# Patient Record
Sex: FEMALE | Race: BLACK OR AFRICAN AMERICAN | NOT HISPANIC OR LATINO | Employment: UNEMPLOYED | URBAN - METROPOLITAN AREA
[De-identification: names, ages, dates, MRNs, and addresses within clinical notes are randomized per-mention and may not be internally consistent; named-entity substitution may affect disease eponyms.]

---

## 2018-08-23 ENCOUNTER — APPOINTMENT (OUTPATIENT)
Dept: LAB | Facility: HOSPITAL | Age: 23
End: 2018-08-23

## 2018-08-23 ENCOUNTER — LAB REQUISITION (OUTPATIENT)
Dept: LAB | Facility: HOSPITAL | Age: 23
End: 2018-08-23

## 2018-08-23 ENCOUNTER — TRANSCRIBE ORDERS (OUTPATIENT)
Dept: LAB | Facility: HOSPITAL | Age: 23
End: 2018-08-23

## 2018-08-23 DIAGNOSIS — Z3A.34 34 WEEKS GESTATION OF PREGNANCY: Primary | ICD-10-CM

## 2018-08-23 DIAGNOSIS — Z34.83 PRENATAL CARE, SUBSEQUENT PREGNANCY, THIRD TRIMESTER: ICD-10-CM

## 2018-08-23 DIAGNOSIS — Z3A.34 34 WEEKS GESTATION OF PREGNANCY: ICD-10-CM

## 2018-08-23 DIAGNOSIS — Z34.83 ENCOUNTER FOR SUPERVISION OF OTHER NORMAL PREGNANCY, THIRD TRIMESTER: ICD-10-CM

## 2018-08-23 DIAGNOSIS — Z00.00 ROUTINE GENERAL MEDICAL EXAMINATION AT A HEALTH CARE FACILITY: ICD-10-CM

## 2018-08-23 LAB
EXTERNAL CHLAMYDIA SCREEN: NEGATIVE
EXTERNAL GONORRHEA SCREEN: NEGATIVE
EXTERNAL GROUP B STREP ANTIGEN: NEGATIVE
EXTERNAL HEPATITIS C AB: NONREACTIVE
EXTERNAL RUBELLA QUALITATIVE: NORMAL
EXTERNAL SYPHILIS RPR SCREEN: NORMAL
HIV1 P24 AG SERPL QL IA: NORMAL

## 2018-08-23 PROCEDURE — 36415 COLL VENOUS BLD VENIPUNCTURE: CPT | Performed by: NURSE PRACTITIONER

## 2018-09-13 ENCOUNTER — HOSPITAL ENCOUNTER (INPATIENT)
Facility: HOSPITAL | Age: 23
LOS: 2 days | Discharge: HOME | End: 2018-09-15
Attending: NURSE PRACTITIONER | Admitting: NURSE PRACTITIONER

## 2018-09-13 ENCOUNTER — ANESTHESIA (OUTPATIENT)
Dept: LABOR AND DELIVERY | Facility: HOSPITAL | Age: 23
End: 2018-09-13

## 2018-09-13 ENCOUNTER — ANESTHESIA EVENT (OUTPATIENT)
Dept: LABOR AND DELIVERY | Facility: HOSPITAL | Age: 23
End: 2018-09-13

## 2018-09-13 LAB
ABO GROUP BLD: NORMAL
BLD GP AB SCN SERPL QL: NEGATIVE
DEPRECATED RDW RBC AUTO: 33.6 FL (ref 37–54)
ERYTHROCYTE [DISTWIDTH] IN BLOOD BY AUTOMATED COUNT: 13.7 % (ref 11.3–14.5)
HCT VFR BLD AUTO: 31.5 % (ref 34.5–44)
HGB BLD-MCNC: 10 G/DL (ref 11.5–15.5)
MCH RBC QN AUTO: 21.4 PG (ref 27–31)
MCHC RBC AUTO-ENTMCNC: 31.7 G/DL (ref 32–36)
MCV RBC AUTO: 67.3 FL (ref 80–99)
PLATELET # BLD AUTO: 122 10*3/MM3 (ref 150–450)
RBC # BLD AUTO: 4.68 10*6/MM3 (ref 3.89–5.14)
RH BLD: POSITIVE
T&S EXPIRATION DATE: NORMAL
WBC NRBC COR # BLD: 4.85 10*3/MM3 (ref 3.5–10.8)

## 2018-09-13 PROCEDURE — C1755 CATHETER, INTRASPINAL: HCPCS | Performed by: ANESTHESIOLOGY

## 2018-09-13 PROCEDURE — 86850 RBC ANTIBODY SCREEN: CPT | Performed by: NURSE PRACTITIONER

## 2018-09-13 PROCEDURE — 25010000002 ROPIVACAINE PER 1 MG: Performed by: ANESTHESIOLOGY

## 2018-09-13 PROCEDURE — 25010000002 FENTANYL CITRATE (PF) 100 MCG/2ML SOLUTION: Performed by: ANESTHESIOLOGY

## 2018-09-13 PROCEDURE — 85027 COMPLETE CBC AUTOMATED: CPT | Performed by: NURSE PRACTITIONER

## 2018-09-13 PROCEDURE — 0HQ9XZZ REPAIR PERINEUM SKIN, EXTERNAL APPROACH: ICD-10-PCS | Performed by: NURSE PRACTITIONER

## 2018-09-13 PROCEDURE — 59025 FETAL NON-STRESS TEST: CPT

## 2018-09-13 PROCEDURE — 86901 BLOOD TYPING SEROLOGIC RH(D): CPT | Performed by: NURSE PRACTITIONER

## 2018-09-13 PROCEDURE — 86900 BLOOD TYPING SEROLOGIC ABO: CPT | Performed by: NURSE PRACTITIONER

## 2018-09-13 RX ORDER — LIDOCAINE HYDROCHLORIDE AND EPINEPHRINE 15; 5 MG/ML; UG/ML
INJECTION, SOLUTION EPIDURAL AS NEEDED
Status: DISCONTINUED | OUTPATIENT
Start: 2018-09-13 | End: 2018-09-13 | Stop reason: SURG

## 2018-09-13 RX ORDER — FENTANYL CITRATE 50 UG/ML
INJECTION, SOLUTION INTRAMUSCULAR; INTRAVENOUS AS NEEDED
Status: DISCONTINUED | OUTPATIENT
Start: 2018-09-13 | End: 2018-09-13 | Stop reason: SURG

## 2018-09-13 RX ORDER — CARBOPROST TROMETHAMINE 250 UG/ML
250 INJECTION, SOLUTION INTRAMUSCULAR AS NEEDED
Status: DISCONTINUED | OUTPATIENT
Start: 2018-09-13 | End: 2018-09-13 | Stop reason: HOSPADM

## 2018-09-13 RX ORDER — IBUPROFEN 600 MG/1
600 TABLET ORAL EVERY 6 HOURS PRN
Status: DISCONTINUED | OUTPATIENT
Start: 2018-09-13 | End: 2018-09-13 | Stop reason: HOSPADM

## 2018-09-13 RX ORDER — OXYTOCIN-SODIUM CHLORIDE 0.9% IV SOLN 30 UNIT/500ML 30-0.9/5 UT/ML-%
650 SOLUTION INTRAVENOUS ONCE
Status: COMPLETED | OUTPATIENT
Start: 2018-09-13 | End: 2018-09-13

## 2018-09-13 RX ORDER — TRISODIUM CITRATE DIHYDRATE AND CITRIC ACID MONOHYDRATE 500; 334 MG/5ML; MG/5ML
30 SOLUTION ORAL ONCE
Status: DISCONTINUED | OUTPATIENT
Start: 2018-09-13 | End: 2018-09-13 | Stop reason: HOSPADM

## 2018-09-13 RX ORDER — OXYCODONE AND ACETAMINOPHEN 10; 325 MG/1; MG/1
2 TABLET ORAL EVERY 4 HOURS PRN
Status: DISCONTINUED | OUTPATIENT
Start: 2018-09-13 | End: 2018-09-13 | Stop reason: HOSPADM

## 2018-09-13 RX ORDER — MISOPROSTOL 200 UG/1
800 TABLET ORAL AS NEEDED
Status: DISCONTINUED | OUTPATIENT
Start: 2018-09-13 | End: 2018-09-13 | Stop reason: HOSPADM

## 2018-09-13 RX ORDER — DIPHENHYDRAMINE HYDROCHLORIDE 50 MG/ML
12.5 INJECTION INTRAMUSCULAR; INTRAVENOUS EVERY 8 HOURS PRN
Status: DISCONTINUED | OUTPATIENT
Start: 2018-09-13 | End: 2018-09-13 | Stop reason: HOSPADM

## 2018-09-13 RX ORDER — LIDOCAINE HYDROCHLORIDE AND EPINEPHRINE 20; 5 MG/ML; UG/ML
INJECTION, SOLUTION EPIDURAL; INFILTRATION; INTRACAUDAL; PERINEURAL AS NEEDED
Status: DISCONTINUED | OUTPATIENT
Start: 2018-09-13 | End: 2018-09-13 | Stop reason: SURG

## 2018-09-13 RX ORDER — METHYLERGONOVINE MALEATE 0.2 MG/ML
200 INJECTION INTRAVENOUS ONCE AS NEEDED
Status: DISCONTINUED | OUTPATIENT
Start: 2018-09-13 | End: 2018-09-13 | Stop reason: HOSPADM

## 2018-09-13 RX ORDER — ACETAMINOPHEN 325 MG/1
650 TABLET ORAL EVERY 4 HOURS PRN
Status: DISCONTINUED | OUTPATIENT
Start: 2018-09-13 | End: 2018-09-13 | Stop reason: HOSPADM

## 2018-09-13 RX ORDER — OXYCODONE HYDROCHLORIDE AND ACETAMINOPHEN 5; 325 MG/1; MG/1
1 TABLET ORAL EVERY 4 HOURS PRN
Status: DISCONTINUED | OUTPATIENT
Start: 2018-09-13 | End: 2018-09-13 | Stop reason: HOSPADM

## 2018-09-13 RX ORDER — OXYTOCIN-SODIUM CHLORIDE 0.9% IV SOLN 30 UNIT/500ML 30-0.9/5 UT/ML-%
85 SOLUTION INTRAVENOUS ONCE
Status: DISCONTINUED | OUTPATIENT
Start: 2018-09-13 | End: 2018-09-13 | Stop reason: HOSPADM

## 2018-09-13 RX ORDER — EPHEDRINE SULFATE/0.9% NACL/PF 50 MG/10ML
10 SYRINGE (ML) INTRAVENOUS
Status: DISCONTINUED | OUTPATIENT
Start: 2018-09-13 | End: 2018-09-13 | Stop reason: HOSPADM

## 2018-09-13 RX ORDER — SODIUM CHLORIDE 0.9 % (FLUSH) 0.9 %
1-10 SYRINGE (ML) INJECTION AS NEEDED
Status: DISCONTINUED | OUTPATIENT
Start: 2018-09-13 | End: 2018-09-13 | Stop reason: HOSPADM

## 2018-09-13 RX ORDER — SODIUM CHLORIDE, SODIUM LACTATE, POTASSIUM CHLORIDE, CALCIUM CHLORIDE 600; 310; 30; 20 MG/100ML; MG/100ML; MG/100ML; MG/100ML
125 INJECTION, SOLUTION INTRAVENOUS CONTINUOUS
Status: DISCONTINUED | OUTPATIENT
Start: 2018-09-13 | End: 2018-09-14

## 2018-09-13 RX ORDER — MAGNESIUM CARB/ALUMINUM HYDROX 105-160MG
30 TABLET,CHEWABLE ORAL ONCE
Status: DISCONTINUED | OUTPATIENT
Start: 2018-09-13 | End: 2018-09-13 | Stop reason: HOSPADM

## 2018-09-13 RX ORDER — ROPIVACAINE HYDROCHLORIDE 2 MG/ML
16 INJECTION, SOLUTION EPIDURAL; INFILTRATION; PERINEURAL CONTINUOUS
Status: DISCONTINUED | OUTPATIENT
Start: 2018-09-13 | End: 2018-09-14

## 2018-09-13 RX ORDER — ONDANSETRON 2 MG/ML
4 INJECTION INTRAMUSCULAR; INTRAVENOUS ONCE AS NEEDED
Status: DISCONTINUED | OUTPATIENT
Start: 2018-09-13 | End: 2018-09-13 | Stop reason: HOSPADM

## 2018-09-13 RX ORDER — METOCLOPRAMIDE HYDROCHLORIDE 5 MG/ML
10 INJECTION INTRAMUSCULAR; INTRAVENOUS ONCE AS NEEDED
Status: DISCONTINUED | OUTPATIENT
Start: 2018-09-13 | End: 2018-09-13 | Stop reason: HOSPADM

## 2018-09-13 RX ADMIN — FENTANYL CITRATE 100 MCG: 50 INJECTION, SOLUTION INTRAMUSCULAR; INTRAVENOUS at 17:13

## 2018-09-13 RX ADMIN — ROPIVACAINE HYDROCHLORIDE 10 ML: 5 INJECTION, SOLUTION EPIDURAL; INFILTRATION; PERINEURAL at 17:13

## 2018-09-13 RX ADMIN — SODIUM CHLORIDE, POTASSIUM CHLORIDE, SODIUM LACTATE AND CALCIUM CHLORIDE 1000 ML: 600; 310; 30; 20 INJECTION, SOLUTION INTRAVENOUS at 16:36

## 2018-09-13 RX ADMIN — SODIUM CHLORIDE, POTASSIUM CHLORIDE, SODIUM LACTATE AND CALCIUM CHLORIDE 125 ML/HR: 600; 310; 30; 20 INJECTION, SOLUTION INTRAVENOUS at 17:26

## 2018-09-13 RX ADMIN — SODIUM CHLORIDE, POTASSIUM CHLORIDE, SODIUM LACTATE AND CALCIUM CHLORIDE 1000 ML: 600; 310; 30; 20 INJECTION, SOLUTION INTRAVENOUS at 16:57

## 2018-09-13 RX ADMIN — ROPIVACAINE HYDROCHLORIDE 16 ML/HR: 2 INJECTION, SOLUTION EPIDURAL; INFILTRATION at 17:20

## 2018-09-13 RX ADMIN — OXYTOCIN-SODIUM CHLORIDE 0.9% IV SOLN 30 UNIT/500ML 650 ML/HR: 30-0.9/5 SOLUTION at 18:38

## 2018-09-13 RX ADMIN — LIDOCAINE HYDROCHLORIDE AND EPINEPHRINE 3 ML: 15; 5 INJECTION, SOLUTION EPIDURAL at 17:10

## 2018-09-13 RX ADMIN — LIDOCAINE HYDROCHLORIDE,EPINEPHRINE BITARTRATE 5 ML: 20; .005 INJECTION, SOLUTION EPIDURAL; INFILTRATION; INTRACAUDAL; PERINEURAL at 17:18

## 2018-09-13 NOTE — L&D DELIVERY NOTE
[facility]  Vaginal Delivery Note    Delivery     Delivery: Vaginal, Spontaneous Delivery  Z3A.37   YOB: 2017    Time of Birth: 6:25 PM      Anesthesia: Epidural     Delivering clinician: Goldie Dover    Forceps?   No   Vacuum? No    Shoulder dystocia present: No        Delivery narrative:  Patient pushed to deliver a male infant over an intact perineum in OA position.  Spontaneous cry noted.  Infant was placed on maternal abdomen and dried.  The infant was then lowered where the cord was milked x3. The cord was doubly clamped and cut.  The placenta was delivered spontaneously and visually intact.  Repair of AMAN labials was performed with 4-0 Rapide. All counts were correct. Mom and baby entered recovery in stable condition.     Infant    Findings: male  infant     Infant observations: Weight: 2995 g (6 lb 9.6 oz)   Length: 19  in  Observations/Comments:         Apgars: 8   @ 1 minute /    9   @ 5 minutes         Placenta, Cord, and Fluid    Placenta delivered  Spontaneous  at  9/13/2018  6:38 PM     Cord: 3 vessels  present.   Nuchal Cord?  no   Cord blood obtained: Yes    Cord gases obtained:  No              Repair    Episiotomy: No   Lacerations: Yes  Laceration Information  Laceration Repaired?   Perineal: None       Periurethral:     Labial:    AMAN     yes   Sulcus:         Vaginal:         Cervical:              Estimated Blood Loss:    150 mls.    Suture used for repair: 4-0 rapide         Complications  none    Disposition  Mother to Mother Baby/Postpartum  in stable condition currently.  Baby to remains with mom  in stable condition currently.      Goldie Dover CNM  09/13/18  6:52 PM

## 2018-09-13 NOTE — ANESTHESIA PREPROCEDURE EVALUATION
Anesthesia Evaluation     Patient summary reviewed and Nursing notes reviewed   NPO Solid Status: > 6 hours  NPO Liquid Status: > 2 hours           Airway   Mallampati: II  TM distance: >3 FB  Neck ROM: full  No difficulty expected  Dental      Pulmonary    (+) a smoker Current,   Cardiovascular - negative cardio ROS        Neuro/Psych- negative ROS  GI/Hepatic/Renal/Endo - negative ROS     Musculoskeletal (-) negative ROS    Abdominal    Substance History - negative use     OB/GYN    (+) Pregnant,         Other        ROS/Med Hx Other: Sickle cell trait                  Anesthesia Plan    ASA 2     epidural     Anesthetic plan, all risks, benefits, and alternatives have been provided, discussed and informed consent has been obtained with: patient.

## 2018-09-13 NOTE — PLAN OF CARE
Problem: Patient Care Overview  Goal: Plan of Care Review  Outcome: Outcome(s) achieved Date Met: 09/13/18 09/13/18 1739   Coping/Psychosocial   Plan of Care Reviewed With patient   Plan of Care Review   Progress improving     Goal: Individualization and Mutuality  Outcome: Ongoing (interventions implemented as appropriate)   09/13/18 1739   Individualization   Patient Specific Goals (Include Timeframe) healthy baby and healthy mommy     Goal: Discharge Needs Assessment  Outcome: Ongoing (interventions implemented as appropriate)   09/13/18 1739   Discharge Needs Assessment   Concerns to be Addressed no discharge needs identified     Goal: Interprofessional Rounds/Family Conf  Outcome: Ongoing (interventions implemented as appropriate)   09/13/18 1739   Interdisciplinary Rounds/Family Conf   Participants nursing       Problem: Labor (Cervical Ripen, Induct, Augment) (Adult,Obstetrics,Pediatric)  Goal: Signs and Symptoms of Listed Potential Problems Will be Absent, Minimized or Managed (Labor)  Outcome: Ongoing (interventions implemented as appropriate)   09/13/18 1739   Goal/Outcome Evaluation   Problems Assessed (Labor) all   Problems Present (Labor) pain

## 2018-09-13 NOTE — H&P
[facility]  Obstetric History and Physical    Chief Complaint   Patient presents with   • Laboring       Subjective     Patient is a 23 y.o. female  currently at 37w0d, who presents with reports of regular painful contractions which have increased over the last 2 hours. She reports good fetal movement. She denies vaginal bleeding or loss of fluid.     Her prenatal care is complicated by  insufficient prenatal care . She had her initial pnv at 24 weeks then no care until 31 weeks. She did not complete her 1 hour glucola or monitor her blood sugars. She had her initial prenatal visit  Her previous obstetric/gynecological history is noted for two previous vaginal deliveries at term and an EAB at approximately 20-24 weeks. .    The following portions of the patients history were reviewed and updated as appropriate: current medications and allergies , past medical history.      Prenatal Information:  Prenatal Results     Initial Prenatal Labs     Test Value Reference Range Date Time    Hemoglobin        Hematocrit        Platelets        Rubella IgG        Hepatitis B SAg        Hepatitis C Ab        RPR        ABO        Rh        Antibody Screen        HIV        Urine Culture        Gonorrhea        Chlamydia        TSH              2nd and 3rd Trimester     Test Value Reference Range Date Time    Hemoglobin (repeated)        Hematocrit (repeated)        GCT        Antibody Screen (repeated)        GTT Fasting        GTT 1 Hr        GTT 2 Hr        GTT 3 Hr        Group B Strep              Drug Screening     Test Value Reference Range Date Time    Amphetamine Screen        Barbiturate Screen        Benzodiazepine Screen        Methadone Screen        Phencyclidine Screen        Opiates Screen        THC Screen        Cocaine Screen        Propoxyphene Screen        Buprenorphine Screen        Methamphetamine Screen        Oxycodone Screen        Tryicyclic Antidepressants Screen              Other (Risk  screening)     Test Value Reference Range Date Time    Varicella IgG        Parvovirus IgG        CMV IgG        Cystic Fibrosis        Hemoglobin electrophoresis        NIPT        MSAFP-4        AFP (for NTD only)                       Past OB History:     Obstetric History       T0      L2     SAB0   TAB0   Ectopic0   Molar0   Multiple0   Live Births2       # Outcome Date GA Lbr Renaldo/2nd Weight Sex Delivery Anes PTL Lv   4 Current            3 AB 17 20w0d          2 Para 16 37w0d  2778 g (6 lb 2 oz) M Vag-Spont EPI N DAYSI   1  12 34w0d  2920 g (6 lb 7 oz) M Vag-Spont EPI Y DAYSI          Past Medical History: Past Medical History:   Diagnosis Date   • Chlamydia     previous preg   • Gonorrhea     previous preg   • Sickle cell trait (CMS/HCC)    • Trichomoniasis    • Urogenital trichomoniasis     previous preg      Past Surgical History History reviewed. No pertinent surgical history.   Family History: Family History   Problem Relation Age of Onset   • No Known Problems Father    • No Known Problems Mother    • No Known Problems Brother    • No Known Problems Sister    • No Known Problems Son    • No Known Problems Daughter    • No Known Problems Paternal Grandfather    • No Known Problems Paternal Grandmother    • No Known Problems Maternal Grandmother    • No Known Problems Maternal Grandfather       Social History:  reports that she has been smoking Cigarettes.  She has been smoking about 0.50 packs per day. She has never used smokeless tobacco.   reports that she does not drink alcohol.   reports that she does not use drugs.        Review of Systems   All other systems reviewed and are negative.        Objective     Vital Signs Range for the last 24 hours  Temperature:  98.1   Temp Source:     BP:     Pulse:     Respirations:  16   SPO2:     O2 Amount (l/min):     O2 Devices     Weight:       Physical Examination: General appearance - alert, well appearing, and in no  distress  Mental status - alert, oriented to person, place, and time  Chest - clear to auscultation, no wheezes, rales or rhonchi, symmetric air entry  Heart - normal rate, regular rhythm, normal S1, S2, no murmurs, rubs, clicks or gallops  Abdomen - soft, nontender, nondistended, no masses or organomegaly  Extremities - peripheral pulses normal, no pedal edema, no clubbing or cyanosis  Skin - normal coloration and turgor, no rashes, no suspicious skin lesions noted    Presentation: vertex   Cervix: Exam by:  harrison borja cnm   Dilation:  5   Effacement:  80   Station:  -1   Bulging membranes noted on exam  Fetal Heart Rate Assessment   Method:  external   Beats/min:  130   Baseline:  130   Variability:  moderate   Accels:  present   Decels:  absent   Tracing Category:  cat 1     Uterine Assessment   Method:  toco   Frequency (min):  q2   Ctx Count in 10 min:     Duration:     Intensity:     Intensity by IUPC:     Resting Tone:     Resting Tone by IUPC:     [place] Units:           Assessment/Plan     labor    Assessment & Plan    Assessment:  1.  Intrauterine pregnancy at 37w0d gestation with reactive, reassuring fetal status.    2.  labor  without ROM  3. GBS status: No results found for: negative    Plan:  1. fetal and uterine monitoring  continuously, expectant management and analgesia with  epidural  2. Plan of care has been reviewed with patient   3.  Risks, benefits of treatment plan have been discussed.  4.  All questions have been answered.        Aga Borja CNM  9/13/2018  4:37 PM

## 2018-09-13 NOTE — ANESTHESIA PROCEDURE NOTES
Labor Epidural    Patient location during procedure: OB  Performed By  Anesthesiologist: TOM VILLARREAL  Preanesthetic Checklist  Completed: patient identified, site marked, surgical consent, pre-op evaluation, timeout performed, IV checked, risks and benefits discussed and monitors and equipment checked  Prep:  Pt Position:sitting  Sterile Tech:gloves, mask, sterile barrier and cap  Prep:chlorhexidine gluconate and isopropyl alcohol and DuraPrep  Monitoring:blood pressure monitoring and continuous pulse oximetry  Epidural Block Procedure:  Approach:midline  Guidance:landmark technique and palpation technique  Location:L3-L4  Needle Type:Tuohy  Needle Gauge:17 G  Loss of Resistance Medium: air  Loss of Resistance: 5cm  Cath Depth at skin:10 cm  Paresthesia: none  Aspiration:negative  Test Dose:negative  Number of Attempts: 1  Post Assessment:  Dressing:secured with tape and occlusive dressing applied (Tegaderm Placed)  Pt Tolerance:patient tolerated the procedure well with no apparent complications  Complications:no

## 2018-09-13 NOTE — PLAN OF CARE
Problem: Labor (Cervical Ripen, Induct, Augment) (Adult,Obstetrics,Pediatric)  Goal: Signs and Symptoms of Listed Potential Problems Will be Absent, Minimized or Managed (Labor)  Outcome: Outcome(s) achieved Date Met: 09/13/18 09/13/18 1901   Goal/Outcome Evaluation   Problems Assessed (Labor) all   Problems Present (Labor) none

## 2018-09-14 LAB
BASOPHILS # BLD AUTO: 0.01 10*3/MM3 (ref 0–0.2)
BASOPHILS NFR BLD AUTO: 0.1 % (ref 0–1)
DEPRECATED RDW RBC AUTO: 33.3 FL (ref 37–54)
EOSINOPHIL # BLD AUTO: 0.08 10*3/MM3 (ref 0–0.3)
EOSINOPHIL NFR BLD AUTO: 1.1 % (ref 0–3)
ERYTHROCYTE [DISTWIDTH] IN BLOOD BY AUTOMATED COUNT: 13.6 % (ref 11.3–14.5)
HCT VFR BLD AUTO: 32.6 % (ref 34.5–44)
HGB BLD-MCNC: 10.2 G/DL (ref 11.5–15.5)
HYPOCHROMIA BLD QL: NORMAL
IMM GRANULOCYTES # BLD: 0.02 10*3/MM3 (ref 0–0.03)
IMM GRANULOCYTES NFR BLD: 0.3 % (ref 0–0.6)
LYMPHOCYTES # BLD AUTO: 1.65 10*3/MM3 (ref 0.6–4.8)
LYMPHOCYTES NFR BLD AUTO: 22.8 % (ref 24–44)
MCH RBC QN AUTO: 21 PG (ref 27–31)
MCHC RBC AUTO-ENTMCNC: 31.3 G/DL (ref 32–36)
MCV RBC AUTO: 67.2 FL (ref 80–99)
MICROCYTES BLD QL: NORMAL
MONOCYTES # BLD AUTO: 0.69 10*3/MM3 (ref 0–1)
MONOCYTES NFR BLD AUTO: 9.5 % (ref 0–12)
NEUTROPHILS # BLD AUTO: 4.78 10*3/MM3 (ref 1.5–8.3)
NEUTROPHILS NFR BLD AUTO: 66.2 % (ref 41–71)
PLAT MORPH BLD: NORMAL
PLATELET # BLD AUTO: 125 10*3/MM3 (ref 150–450)
RBC # BLD AUTO: 4.85 10*6/MM3 (ref 3.89–5.14)
WBC MORPH BLD: NORMAL
WBC NRBC COR # BLD: 7.23 10*3/MM3 (ref 3.5–10.8)

## 2018-09-14 PROCEDURE — 85007 BL SMEAR W/DIFF WBC COUNT: CPT | Performed by: NURSE PRACTITIONER

## 2018-09-14 PROCEDURE — 85025 COMPLETE CBC W/AUTO DIFF WBC: CPT | Performed by: NURSE PRACTITIONER

## 2018-09-14 RX ORDER — LANOLIN 100 %
OINTMENT (GRAM) TOPICAL
Status: DISCONTINUED | OUTPATIENT
Start: 2018-09-14 | End: 2018-09-15 | Stop reason: HOSPADM

## 2018-09-14 RX ORDER — HYDROCODONE BITARTRATE AND ACETAMINOPHEN 5; 325 MG/1; MG/1
1 TABLET ORAL EVERY 4 HOURS PRN
Status: DISCONTINUED | OUTPATIENT
Start: 2018-09-14 | End: 2018-09-15 | Stop reason: HOSPADM

## 2018-09-14 RX ORDER — DOCUSATE SODIUM 100 MG/1
100 CAPSULE, LIQUID FILLED ORAL 2 TIMES DAILY
Status: DISCONTINUED | OUTPATIENT
Start: 2018-09-14 | End: 2018-09-15 | Stop reason: HOSPADM

## 2018-09-14 RX ORDER — IBUPROFEN 600 MG/1
600 TABLET ORAL EVERY 6 HOURS PRN
Status: DISCONTINUED | OUTPATIENT
Start: 2018-09-14 | End: 2018-09-15 | Stop reason: HOSPADM

## 2018-09-14 RX ORDER — ONDANSETRON 2 MG/ML
4 INJECTION INTRAMUSCULAR; INTRAVENOUS EVERY 6 HOURS PRN
Status: DISCONTINUED | OUTPATIENT
Start: 2018-09-14 | End: 2018-09-15 | Stop reason: HOSPADM

## 2018-09-14 RX ORDER — OXYCODONE HYDROCHLORIDE AND ACETAMINOPHEN 5; 325 MG/1; MG/1
1 TABLET ORAL EVERY 4 HOURS PRN
Status: DISCONTINUED | OUTPATIENT
Start: 2018-09-14 | End: 2018-09-15 | Stop reason: HOSPADM

## 2018-09-14 RX ORDER — SODIUM CHLORIDE 0.9 % (FLUSH) 0.9 %
1-10 SYRINGE (ML) INJECTION AS NEEDED
Status: DISCONTINUED | OUTPATIENT
Start: 2018-09-14 | End: 2018-09-15 | Stop reason: HOSPADM

## 2018-09-14 RX ORDER — FERROUS SULFATE 325(65) MG
325 TABLET ORAL 2 TIMES DAILY WITH MEALS
Status: DISCONTINUED | OUTPATIENT
Start: 2018-09-14 | End: 2018-09-15 | Stop reason: HOSPADM

## 2018-09-14 RX ADMIN — IBUPROFEN 600 MG: 600 TABLET ORAL at 03:34

## 2018-09-14 RX ADMIN — DOCUSATE SODIUM 100 MG: 100 CAPSULE, LIQUID FILLED ORAL at 22:07

## 2018-09-14 RX ADMIN — IBUPROFEN 600 MG: 600 TABLET ORAL at 22:07

## 2018-09-14 RX ADMIN — DOCUSATE SODIUM 100 MG: 100 CAPSULE, LIQUID FILLED ORAL at 08:57

## 2018-09-14 RX ADMIN — FERROUS SULFATE TAB 325 MG (65 MG ELEMENTAL FE) 325 MG: 325 (65 FE) TAB at 16:56

## 2018-09-14 RX ADMIN — IBUPROFEN 600 MG: 600 TABLET ORAL at 10:16

## 2018-09-14 RX ADMIN — Medication: at 03:34

## 2018-09-14 RX ADMIN — WITCH HAZEL 1 PAD: 500 SOLUTION RECTAL; TOPICAL at 03:34

## 2018-09-14 RX ADMIN — FERROUS SULFATE TAB 325 MG (65 MG ELEMENTAL FE) 325 MG: 325 (65 FE) TAB at 08:57

## 2018-09-14 NOTE — PROGRESS NOTES
[facility]  Vaginal Delivery Progress Note    Subjective     Doing well, pain controlled, lochia less than menses      Objective     Vital Signs Range for the last 24 hours  Temperature: Temp:  [97.9 °F (36.6 °C)-98.8 °F (37.1 °C)] 97.9 °F (36.6 °C)   Temp Source: Temp src: Oral   BP: BP: ()/(50-81) 110/64   Pulse: Heart Rate:  [] 65   Respirations: Resp:  [16-18] 16   SPO2: SpO2:  [93 %-100 %] 93 %   O2 Amount (l/min):     O2 Devices           Physical Exam:  General:  no acute distresss.  Abdomen: Soft, non-tender, fundus firm  Extremities: normal, atraumatic, no cyanosis, and trace edema.       Lab results reviewed:  Yes    Lab Results   Component Value Date    WBC 4.85 09/13/2018    HGB 10.0 (L) 09/13/2018    HCT 31.5 (L) 09/13/2018    MCV 67.3 (L) 09/13/2018     (L) 09/13/2018         Assessment/Plan     Spontaneous vaginal delivery    Pooja Duron is Day 1  post-partum       Plan:  Continue current care.      Aga Borja CNM  9/14/2018  8:30 AM

## 2018-09-14 NOTE — ANESTHESIA POSTPROCEDURE EVALUATION
Patient: Pooja Duron    Procedure Summary     Date:  09/13/18 Room / Location:      Anesthesia Start:  1659 Anesthesia Stop:  1838    Procedure:  LABOR ANALGESIA Diagnosis:      Scheduled Providers:   Provider:  Beto Cao DO    Anesthesia Type:  epidural ASA Status:  2          Anesthesia Type: epidural  Last vitals  BP   110/64 (09/14/18 0700)   Temp   97.9 °F (36.6 °C) (09/14/18 0700)   Pulse   65 (09/14/18 0700)   Resp   16 (09/14/18 0700)     SpO2   93 % (09/13/18 1712)     Post Anesthesia Care and Evaluation    Patient location during evaluation: bedside  Patient participation: complete - patient participated  Level of consciousness: awake and alert  Pain management: adequate  Airway patency: patent  Anesthetic complications: No anesthetic complications    Cardiovascular status: acceptable  Respiratory status: acceptable  Hydration status: acceptable  Post Neuraxial Block status: Motor and sensory function returned to baseline and No signs or symptoms of PDPH

## 2018-09-14 NOTE — CONSULTS
Continued Stay Note  [facility]     Patient Name: Pooja Duron  MRN: [id number]  Today's Date: 9/14/2018    Admit Date: 9/13/2018          Discharge Plan     Row Name 09/14/18 1440       Plan    Plan MSW available.     Plan Comments Visited pt. Discussed her limited PNC. Pt states she does not drive and had transportation issues. States she uses [facility] for pediaterician. States she ahs all needed for pt. Plans to apply for WIC. She lives with her mother and reports she is supportive.     Final Discharge Disposition Code 01 - home or self-care              Discharge Codes    No documentation.           GAIL Randle

## 2018-09-15 VITALS
SYSTOLIC BLOOD PRESSURE: 112 MMHG | OXYGEN SATURATION: 93 % | TEMPERATURE: 98.1 F | WEIGHT: 165 LBS | HEART RATE: 68 BPM | HEIGHT: 67 IN | BODY MASS INDEX: 25.9 KG/M2 | DIASTOLIC BLOOD PRESSURE: 56 MMHG | RESPIRATION RATE: 16 BRPM

## 2018-09-15 RX ORDER — PRENATAL VIT/IRON FUM/FOLIC AC 27MG-0.8MG
1 TABLET ORAL DAILY
Status: DISCONTINUED | OUTPATIENT
Start: 2018-09-15 | End: 2018-09-15 | Stop reason: HOSPADM

## 2018-09-15 RX ORDER — PSEUDOEPHEDRINE HCL 30 MG
100 TABLET ORAL 2 TIMES DAILY
Qty: 90 EACH | Refills: 1 | Status: ON HOLD | OUTPATIENT
Start: 2018-09-15 | End: 2022-12-08 | Stop reason: SDUPTHER

## 2018-09-15 RX ORDER — PRENATAL VIT/IRON FUM/FOLIC AC 27MG-0.8MG
1 TABLET ORAL DAILY
Qty: 90 TABLET | Refills: 3 | Status: SHIPPED | OUTPATIENT
Start: 2018-09-15

## 2018-09-15 RX ORDER — FERROUS SULFATE 325(65) MG
325 TABLET ORAL 2 TIMES DAILY WITH MEALS
Qty: 90 TABLET | Refills: 1 | Status: SHIPPED | OUTPATIENT
Start: 2018-09-15 | End: 2022-12-08

## 2018-09-15 RX ORDER — IBUPROFEN 600 MG/1
600 TABLET ORAL EVERY 6 HOURS PRN
Qty: 90 TABLET | Refills: 3 | Status: SHIPPED | OUTPATIENT
Start: 2018-09-15 | End: 2022-11-09 | Stop reason: HOSPADM

## 2018-09-15 RX ADMIN — DOCUSATE SODIUM 100 MG: 100 CAPSULE, LIQUID FILLED ORAL at 08:45

## 2018-09-15 RX ADMIN — PRENATAL VIT W/ FE FUMARATE-FA TAB 27-0.8 MG 1 TABLET: 27-0.8 TAB at 08:45

## 2018-09-15 RX ADMIN — IBUPROFEN 600 MG: 600 TABLET ORAL at 08:45

## 2018-09-15 RX ADMIN — FERROUS SULFATE TAB 325 MG (65 MG ELEMENTAL FE) 325 MG: 325 (65 FE) TAB at 08:45

## 2018-09-15 NOTE — DISCHARGE SUMMARY
[facility]  Delivery Discharge Summary    Primary OB Clinician:     EDC: Estimated Date of Delivery: 10/4/18    Gestational Age:37w0d    Date of Admission: 2018    Admission Diagnosis:  labor    Discharge Diagnosis: Same    Date of Delivery: 2018   Time of Delivery: 6:25 PM     Delivered By:  Goldie Dover     Delivery Type: Vaginal, Spontaneous Delivery          Baby:@BABYNOHDR(SEX)@ infant;   Apgar:  8   @ 1 minute /   Apgar:  9   @ 5 minutes   Weight: @BABYNOHDR(BIRTHWEIGHT)@   Length: @BABYNOHDR(DELRECITE,B,74113,,,,)@    Anesthesia: Epidural      Laceration: Yes  Laceration Information  Laceration Repaired?   Perineal: None       Periurethral: bilateral  Yes    Labial:         Sulcus:         Vaginal:         Cervical:               Exam:  Normal postpartum exam    Hospital Course:  Hospital course has been stable.  Patient is tolerating po, voiding without difficulty and ready for discharge.  Please see medication reconciliation and lab report for additional details.      Follow Up:  Patient has been given a follow up appointment in our office in 6 weeks. Pelvic rest for 6 weeks. Postpartum lochia, mastitis, and depression warning signs reviewed.     Discharge Date: 9/15/2018; Discharge Time: 8:29 AM

## 2018-09-15 NOTE — PROGRESS NOTES
[place]  Vaginal Delivery Progress Note    Subjective     Doing well, pain controlled, lochia less than menses      Objective     Vital Signs Range for the last 24 hours  Temperature: Temp:  [97.8 °F (36.6 °C)-98.5 °F (36.9 °C)] 98.1 °F (36.7 °C)   Temp Source: Temp src: Oral   BP: BP: (112-129)/(56-73) 112/56   Pulse: Heart Rate:  [62-68] 68   Respirations: Resp:  [16] 16   SPO2:     O2 Amount (l/min):     O2 Devices           Physical Exam:  General:  no acute distresss.  Abdomen: Soft, non-tender, fundus firm  Extremities: normal, atraumatic, no cyanosis, and trace edema.       Lab results reviewed:  Yes    Lab Results   Component Value Date    WBC 7.23 09/14/2018    HGB 10.2 (L) 09/14/2018    HCT 32.6 (L) 09/14/2018    MCV 67.2 (L) 09/14/2018     (L) 09/14/2018         Assessment/Plan     Active Problems:    Spontaneous vaginal delivery      Pooja Duron is Day 2  post-partum       Plan:  Discharge home with standard precautions and return to clinic in 4-6 weeks.      Aga Borja CNM  9/15/2018  8:20 AM

## 2021-04-19 ENCOUNTER — IMMUNIZATION (OUTPATIENT)
Dept: VACCINE CLINIC | Facility: HOSPITAL | Age: 26
End: 2021-04-19

## 2021-04-19 PROCEDURE — 91300 HC SARSCOV02 VAC 30MCG/0.3ML IM: CPT | Performed by: INTERNAL MEDICINE

## 2021-04-19 PROCEDURE — 0001A: CPT | Performed by: INTERNAL MEDICINE

## 2021-05-10 ENCOUNTER — APPOINTMENT (OUTPATIENT)
Dept: VACCINE CLINIC | Facility: HOSPITAL | Age: 26
End: 2021-05-10

## 2022-11-09 ENCOUNTER — HOSPITAL ENCOUNTER (OUTPATIENT)
Facility: HOSPITAL | Age: 27
Discharge: HOME | End: 2022-11-09
Attending: OBSTETRICS & GYNECOLOGY | Admitting: OBSTETRICS & GYNECOLOGY

## 2022-11-09 VITALS
WEIGHT: 193 LBS | SYSTOLIC BLOOD PRESSURE: 112 MMHG | RESPIRATION RATE: 16 BRPM | TEMPERATURE: 97.7 F | HEIGHT: 67 IN | DIASTOLIC BLOOD PRESSURE: 60 MMHG | BODY MASS INDEX: 30.29 KG/M2 | HEART RATE: 71 BPM

## 2022-11-09 PROBLEM — Z34.90 PREGNANCY: Status: ACTIVE | Noted: 2022-11-09

## 2022-11-09 PROCEDURE — 59025 FETAL NON-STRESS TEST: CPT

## 2022-11-09 PROCEDURE — 99218 PR INITIAL OBSERVATION CARE/DAY 30 MINUTES: CPT | Performed by: OBSTETRICS & GYNECOLOGY

## 2022-11-09 PROCEDURE — G0463 HOSPITAL OUTPT CLINIC VISIT: HCPCS

## 2022-11-09 PROCEDURE — 59025 FETAL NON-STRESS TEST: CPT | Performed by: OBSTETRICS & GYNECOLOGY

## 2022-11-09 NOTE — H&P
[facility]  Obstetric History and Physical    Referring Provider: Cris Green MD      Chief Complaint   Patient presents with   • Abdominal Cramping       Subjective     Patient is a 27 y.o. female  currently at 34w5d, who presents with c/o abdominal cramping and vaginal pressure patient reports constant menstrual type cramping and vaginal pressure since 5 AM this morning.  Patient denies recent trauma, fever, urinary symptoms, vaginal bleeding, leaking of fluid, any other concerns.  Patient reports normal fetal activity.   course has been uncomplicated to date..        The following portions of the patients history were reviewed and updated as appropriate: current medications, allergies, past medical history, past surgical history, past family history, past social history and problem list .       Prenatal Information:   Maternal Prenatal Labs  Blood Type No results found for: ABO   Rh Status No results found for: RH   Antibody Screen No results found for: ABSCRN   Gonnorhea No results found for: GCCX   Chlamydia No results found for: CLAMYDCU   RPR No results found for: RPR   Syphilis Antibody No results found for: SYPHILIS   Rubella No results found for: RUBELLAIGGIN   Hepatitis B Surface Antigen No results found for: HEPBSAG   HIV-1 Antibody No results found for: LABHIV1   Hepatitis C Antibody No results found for: HEPCAB   Rapid Urin Drug Screen No results found for: AMPMETHU, BARBITSCNUR, LABBENZSCN, LABMETHSCN, LABOPIASCN, THCURSCR, COCAINEUR, AMPHETSCREEN, PROPOXSCN, BUPRENORSCNU, METAMPSCNUR, OXYCODONESCN, TRICYCLICSCN   Group B Strep Culture No results found for: GBSANTIGEN           External Prenatal Results     Pregnancy Outside Results - Transcribed From Office Records - See Scanned Records For Details     Test Value Date Time    ABO  B  18 1640    Rh  Positive  18 1640    Antibody Screen  Negative  18 1640    Varicella IgG       Rubella ^ Immune  18     Hgb   10.2 g/dL 18 0710    Hct  32.6 % 18 0710    Glucose Fasting GTT       Glucose Tolerance Test 1 hour       Glucose Tolerance Test 3 hour       Gonorrhea (discrete) ^ Negative  18     Chlamydia (discrete) ^ Negative  18     RPR ^ Non-Reactive  18     VDRL       Syphilis Antibody       HBsAg       Herpes Simplex Virus PCR       Herpes Simplex VIrus Culture       HIV ^ Non-Reactive  18     Hep C RNA Quant PCR       Hep C Antibody ^ nonreactive  18     AFP       Group B Strep ^ Negative  18     GBS Susceptibility to Clindamycin       GBS Susceptibility to Erythromycin       Fetal Fibronectin       Genetic Testing, Maternal Blood             Drug Screening     Test Value Date Time    Urine Drug Screen       Amphetamine Screen       Barbiturate Screen       Benzodiazepine Screen       Methadone Screen       Phencyclidine Screen       Opiates Screen       THC Screen       Cocaine Screen       Propoxyphene Screen       Buprenorphine Screen       Methamphetamine Screen       Oxycodone Screen       Tricyclic Antidepressants Screen             Legend    ^: Historical                          Past OB History:       OB History    Para Term  AB Living   5 3 1 1 1 3   SAB IAB Ectopic Molar Multiple Live Births   0 0 0 0 0 3      # Outcome Date GA Lbr Renaldo/2nd Weight Sex Delivery Anes PTL Lv   5 Current            4 Term 18 37w0d 06:17 / 00:08 2995 g (6 lb 9.6 oz) M Vag-Spont EPI N DAYSI      Name: BARBRA MCMILLAN      Apgar1: 8  Apgar5: 9   3 AB 17 20w0d          2 Para 16 37w0d  2778 g (6 lb 2 oz) M Vag-Spont EPI N DAYSI   1  12 34w0d  2920 g (6 lb 7 oz) M Vag-Spont EPI Y DAYSI       Past Medical History: Past Medical History:   Diagnosis Date   • Chlamydia     previous preg   • Gonorrhea     previous preg   • Sickle cell trait (HCC)    • Trichomoniasis    • Urogenital trichomoniasis     previous preg      Past Surgical History History  reviewed. No pertinent surgical history.   Family History: Family History   Problem Relation Age of Onset   • No Known Problems Father    • No Known Problems Mother    • No Known Problems Brother    • No Known Problems Sister    • No Known Problems Son    • No Known Problems Daughter    • No Known Problems Paternal Grandfather    • No Known Problems Paternal Grandmother    • No Known Problems Maternal Grandmother    • No Known Problems Maternal Grandfather       Social History:  reports that she has quit smoking. Her smoking use included cigarettes. She smoked an average of .5 packs per day. She has never used smokeless tobacco.   reports no history of alcohol use.   reports no history of drug use.                   General ROS Negative Findings:Headaches, Visual Changes, Epigastric pain, Anorexia, Nausia/Vomiting, ROM and Vaginal Bleeding    ROS     All other systems have been reviewed and are neg  Objective       Vital Signs Range for the last 24 hours  Temperature: Temp:  [97.7 °F (36.5 °C)] 97.7 °F (36.5 °C)   Temp Source: Temp src: Oral   BP: BP: (112)/(60) 112/60   Pulse: Heart Rate:  [71] 71   Respirations: Resp:  [16] 16   SPO2:     O2 Amount (l/min):     O2 Devices     Weight: Weight:  [87.5 kg (193 lb)] 87.5 kg (193 lb)     Physical Examination:   General:   alert, appears stated age and cooperative   Skin:   normal   HEENT:  Sclera clear   Lungs:      Heart:      Gastrointestinal:  Abdomen soft, gravid uterus, guarding benign exam   Lower Extremities:  No edema, no calf tenderness   : External genitalia: normal general appearance  Uterus: enlarged   Musculoskeletal:     Neuro:  No focal deficits noted DTR 2+4 no         Presentation:  Vertex   Cervix: Exam by:     Dilation:  1 cm   Effacement:  60%   Station:  -3  Blood noted on glove       Fetal Heart Rate Assessment   Method:     Beats/min:     Baseline:     Varibility:     Accels:     Decels:     Tracing Category:     NST-indications abdominal pain,  interpretation reactive, moderate variability, accelerations present 15 x 15, no fetal deceleration noted.  Onset 0752 end time 0 840.  No regular contractions noted.  Uterine Assessment   Method:     Frequency (min):     Ctx Count in 10 min:     Duration:     Intensity:     Intensity by IUPC:     Resting Tone:     Resting Tone by IUPC:     [place] Units:       Laboratory Results:   Lab Results (last 24 hours)     ** No results found for the last 24 hours. **        Radiology Review:   Imaging Results (Last 24 Hours)     ** No results found for the last 24 hours. **        Other Studies:    Assessment & Plan       Pregnancy        Assessment:  1.  Intrauterine pregnancy at 34w5d weeks gestation with reactive fetal status.    2.  False labor  3.   4.      Plan:  1.  Discharged home, kick count,  labor instructions given, follow-up with OB graciela routinely.  2. Plan of care has been reviewed with patient.  3.  Risks, benefits of treatment plan have been discussed.  4.  All questions have been answered.  5      Kamlesh Argueta DO  2022  08:45 EST

## 2022-11-19 ENCOUNTER — HOSPITAL ENCOUNTER (OUTPATIENT)
Facility: HOSPITAL | Age: 27
Discharge: HOME | End: 2022-11-19
Attending: OBSTETRICS & GYNECOLOGY | Admitting: OBSTETRICS & GYNECOLOGY

## 2022-11-19 VITALS
SYSTOLIC BLOOD PRESSURE: 115 MMHG | HEIGHT: 67 IN | BODY MASS INDEX: 30.23 KG/M2 | HEART RATE: 89 BPM | DIASTOLIC BLOOD PRESSURE: 70 MMHG | TEMPERATURE: 97.9 F | RESPIRATION RATE: 14 BRPM

## 2022-11-19 PROCEDURE — G0463 HOSPITAL OUTPT CLINIC VISIT: HCPCS

## 2022-11-19 PROCEDURE — 99212 OFFICE O/P EST SF 10 MIN: CPT | Performed by: OBSTETRICS & GYNECOLOGY

## 2022-11-19 NOTE — H&P
HEAVENLY Maxwell  Obstetric History and Physical    Chief Complaint   Patient presents with   • Contractions       Subjective     Patient is a 27 y.o. female  currently at 36w1d, who presents with contractions.   She was 2 cm on admission and her contractions all but stopped and she was asleep while being observed for labour.  She denies vaginal leaking or bleeding.  +FM.    She was concerned because her last pregnancy delivered around 36 weeks and she went so quickly she barely made it here and could not get an epidural.            The following portions of the patients history were reviewed and updated as appropriate: current medications, allergies, past medical history, past surgical history, past family history, past social history and problem list .       Prenatal Information:   Prenatal Labs  Lab Results   Component Value Date    ABO B 2018    RH Positive 2018    ABSCRN Negative 2018    HEPCVIRUSABY nonreactive 2018         External Prenatal Results     Pregnancy Outside Results - Transcribed From Office Records - See Scanned Records For Details     Test Value Date Time    ABO  B  18 1640    Rh  Positive  18 1640    Antibody Screen  Negative  18 1640    Varicella IgG       Rubella ^ Immune  18     Hgb  10.2 g/dL 18 0710    Hct  32.6 % 18 0710    Glucose Fasting GTT       Glucose Tolerance Test 1 hour       Glucose Tolerance Test 3 hour       Gonorrhea (discrete) ^ Negative  18     Chlamydia (discrete) ^ Negative  18     RPR ^ Non-Reactive  18     VDRL       Syphilis Antibody       HBsAg       Herpes Simplex Virus PCR       Herpes Simplex VIrus Culture       HIV ^ Non-Reactive  18     Hep C RNA Quant PCR       Hep C Antibody ^ nonreactive  18     AFP       Group B Strep ^ Negative  18     GBS Susceptibility to Clindamycin       GBS Susceptibility to Erythromycin       Fetal Fibronectin       Genetic Testing,  Maternal Blood             Drug Screening     Test Value Date Time    Urine Drug Screen       Amphetamine Screen       Barbiturate Screen       Benzodiazepine Screen       Methadone Screen       Phencyclidine Screen       Opiates Screen       THC Screen       Cocaine Screen       Propoxyphene Screen       Buprenorphine Screen       Methamphetamine Screen       Oxycodone Screen       Tricyclic Antidepressants Screen             Legend    ^: Historical                          Past OB History:       OB History    Para Term  AB Living   8 4 1 2 3 3   SAB IAB Ectopic Molar Multiple Live Births   0 2 0 0 0 3      # Outcome Date GA Lbr Renaldo/2nd Weight Sex Delivery Anes PTL Lv   8 Current            7 IAB            6  19 36w0d          5 Term 18 37w0d 06:17 / 00:08 2995 g (6 lb 9.6 oz) M Vag-Spont EPI N DAYSI      Name: KIARRA MCMILLANCHAYONATALYA      Apgar1: 8  Apgar5: 9   4 IAB            3 AB 17 20w0d          2 Para 16 37w0d  2778 g (6 lb 2 oz) M Vag-Spont EPI N DAYSI   1  12 34w0d  2920 g (6 lb 7 oz) M Vag-Spont EPI Y DAYSI       Past Medical History: Past Medical History:   Diagnosis Date   • Chlamydia     previous preg   • Gonorrhea     previous preg   • Sickle cell trait (HCC)    • Trichomoniasis    • Urogenital trichomoniasis     previous preg      Past Surgical History History reviewed. No pertinent surgical history.   Family History: Family History   Problem Relation Age of Onset   • No Known Problems Father    • No Known Problems Mother    • No Known Problems Brother    • No Known Problems Sister    • No Known Problems Son    • No Known Problems Daughter    • No Known Problems Paternal Grandfather    • No Known Problems Paternal Grandmother    • No Known Problems Maternal Grandmother    • No Known Problems Maternal Grandfather       Social History:  reports that she quit smoking about 10 months ago. Her smoking use included cigarettes. She smoked an average of  .5 packs per day. She has never used smokeless tobacco.   reports no history of alcohol use.   reports no history of drug use.        General ROS: Denies fever, HA, shortness of air, muscle weakness, and rashes    Objective       Vital Signs Range for the last 24 hours  Temperature: Temp:  [97.9 °F (36.6 °C)] 97.9 °F (36.6 °C)   Temp Source: Temp src: Oral   BP: BP: (115)/(70) 115/70   Pulse: Heart Rate:  [89] 89   Respirations: Resp:  [14] 14   SPO2:     O2 Amount (l/min):     O2 Devices     Weight:       Physical Examination: Alert and oriented X 3  Chest - clear to auscultation, no wheezes, rales or rhonchi, symmetric air entry  Heart - normal rate, S1, S2, no murmurs  Abdomen - no rebound tenderness noted  bowel sounds normal  Gravid uterus, No RUQ pain, No guarding  Extremities - Non-tender bilaterally        Cervix: Exam by:  R.N.    Dilation:  2-3   Effacement:  50   Station:  -2       Fetal Heart Rate Assessment   Method:     Beats/min:     Baseline:  140s   Varibility:  mod   Accels:  y   Decels:  n   Tracing Category:  1     Uterine Assessment   Method:     Frequency (min):  3-4 per hour    Ctx Count in 10 min:     Duration:     Intensity:  mild    Intensity by IUPC:     Resting Tone:     Resting Tone by IUPC:     [place] Units:               Assessment:  1.  Intrauterine pregnancy at 36w1d weeks gestation with reactive fetal status.    2.  False labour not ruptured    Plan:  1. FHTs  Reactive NST  2. No cervical change or signs and symptoms of SROM or labour  3. Reviewed labour guidelines  4. All questions have been answered.  5. Discharge home with routine OB follow-up      Tomas Yo MD  11/19/2022  06:49 EST

## 2022-12-02 ENCOUNTER — HOSPITAL ENCOUNTER (OUTPATIENT)
Facility: HOSPITAL | Age: 27
Discharge: HOME | End: 2022-12-02
Attending: OBSTETRICS & GYNECOLOGY | Admitting: OBSTETRICS & GYNECOLOGY

## 2022-12-02 VITALS
BODY MASS INDEX: 32.18 KG/M2 | DIASTOLIC BLOOD PRESSURE: 78 MMHG | RESPIRATION RATE: 16 BRPM | WEIGHT: 205 LBS | HEIGHT: 67 IN | TEMPERATURE: 98.1 F | SYSTOLIC BLOOD PRESSURE: 130 MMHG

## 2022-12-02 PROCEDURE — 59025 FETAL NON-STRESS TEST: CPT

## 2022-12-02 PROCEDURE — G0463 HOSPITAL OUTPT CLINIC VISIT: HCPCS

## 2022-12-02 PROCEDURE — 87081 CULTURE SCREEN ONLY: CPT | Performed by: OBSTETRICS & GYNECOLOGY

## 2022-12-03 NOTE — H&P
HEAVENLY Maxwell  Obstetric History and Physical TRIAGE    CC: CONTRACTIONS    SUBJECTIVE:     Patient is a 27 y.o. female  currently at 38w0d, who presents with complaints of contractions. She has had limited PNC with only 3 office visits to date. Her pregnancy is c/b history of PTD x2.  She reports irregular contractions beginning earlier this afternoon. + FM no VB or LOF.       Prenatal Information:  Prenatal Results     POC Urine Glucose/Protein     Test Value Reference Range Date Time    Urine Glucose        Urine Protein              Initial Prenatal Labs     Test Value Reference Range Date Time    Hemoglobin        Hematocrit        Platelets        Rubella IgG ^ Immune   18     Hepatitis B SAg        Hepatitis C Ab ^ nonreactive   18     RPR ^ Non-Reactive   18     ABO  B   18 1640    Rh  Positive   18 1640    Antibody Screen        HIV ^ Non-Reactive   18     Urine Culture        Gonorrhea ^ Negative   18     Chlamydia ^ Negative   18     TSH        HgB A1c               2nd and 3rd Trimester     Test Value Reference Range Date Time    Hemoglobin (repeated)        Hematocrit (repeated)        Platelets         GCT        Antibody Screen (repeated)        GTT Fasting        GTT 1 Hr        GTT 2 Hr        GTT 3 Hr        Group B Strep              Drug Screening     Test Value Reference Range Date Time    Amphetamine Screen        Barbiturate Screen        Benzodiazepine Screen        Methadone Screen        Phencyclidine Screen        Opiates Screen        THC Screen        Cocaine Screen        Propoxyphene Screen        Buprenorphine Screen        Methamphetamine Screen        Oxycodone Screen        Tricyclic Antidepressants Screen              Other (Risk screening)     Test Value Reference Range Date Time    Varicella IgG        Parvovirus IgG        CMV IgG        Cystic Fibrosis        Hemoglobin electrophoresis        NIPT        MSAFP-4        AFP  (for NTD only)              Legend    ^: Historical                      External Prenatal Results     Pregnancy Outside Results - Transcribed From Office Records - See Scanned Records For Details     Test Value Date Time    ABO  B  18 1640    Rh  Positive  18 1640    Antibody Screen  Negative  18 1640    Varicella IgG       Rubella ^ Immune  18     Hgb  10.2 g/dL 18 0710    Hct  32.6 % 18 0710    Glucose Fasting GTT       Glucose Tolerance Test 1 hour       Glucose Tolerance Test 3 hour       Gonorrhea (discrete) ^ Negative  18     Chlamydia (discrete) ^ Negative  18     RPR ^ Non-Reactive  18     VDRL       Syphilis Antibody       HBsAg       Herpes Simplex Virus PCR       Herpes Simplex VIrus Culture       HIV ^ Non-Reactive  18     Hep C RNA Quant PCR       Hep C Antibody ^ nonreactive  18     AFP       Group B Strep ^ Negative  18     GBS Susceptibility to Clindamycin       GBS Susceptibility to Erythromycin       Fetal Fibronectin       Genetic Testing, Maternal Blood             Drug Screening     Test Value Date Time    Urine Drug Screen       Amphetamine Screen       Barbiturate Screen       Benzodiazepine Screen       Methadone Screen       Phencyclidine Screen       Opiates Screen       THC Screen       Cocaine Screen       Propoxyphene Screen       Buprenorphine Screen       Methamphetamine Screen       Oxycodone Screen       Tricyclic Antidepressants Screen             Legend    ^: Historical                         OB History:                     OB History    Para Term  AB Living   8 4 1 2 3 4   SAB IAB Ectopic Molar Multiple Live Births   1 2 0 0 0 4      # Outcome Date GA Lbr Renaldo/2nd Weight Sex Delivery Anes PTL Lv   8 Current            7 IAB 2020           6  19 36w0d    Vag-Spont   DAYSI   5 Term 18 37w0d 06:17 / 00:08 2995 g (6 lb 9.6 oz) M Vag-Spont EPI N DAYSI      Name: BARBRA MCMILLAN       Apgar1: 8  Apgar5: 9   4 IAB 2018           3 SAB 17 20w0d          2 Para 16 37w0d  2778 g (6 lb 2 oz) M Vag-Spont EPI N DAYSI   1  12 34w0d  2920 g (6 lb 7 oz) M Vag-Spont EPI Y DAYSI      Allergies:                      No Known Allergies   Past Medical History: Past Medical History:   Diagnosis Date   • Chlamydia     previous preg   • Gonorrhea     previous preg   • Sickle cell trait (HCC)    • Trichomoniasis    • Urogenital trichomoniasis     previous preg      Past Surgical History Past Surgical History:   Procedure Laterality Date   • TONSILLECTOMY     • WISDOM TOOTH EXTRACTION        Family History: Family History   Problem Relation Age of Onset   • No Known Problems Father    • No Known Problems Mother    • No Known Problems Brother    • No Known Problems Sister    • No Known Problems Son    • No Known Problems Daughter    • No Known Problems Paternal Grandfather    • No Known Problems Paternal Grandmother    • No Known Problems Maternal Grandmother    • No Known Problems Maternal Grandfather       Social History:  reports that she quit smoking about 11 months ago. Her smoking use included cigarettes. She smoked an average of .5 packs per day. She has never used smokeless tobacco.   reports no history of alcohol use.   reports no history of drug use.    General ROS: Pertinent items are noted in HPI, all other systems reviewed and negative   Medications: docusate sodium, ferrous sulfate, and prenatal vitamin 27-0.8       Objective       Vital Signs Range for the last 24 hours  Temperature: Temp:  [98.1 °F (36.7 °C)] 98.1 °F (36.7 °C)   Temp Source: Temp src: Oral   BP:     Pulse:     Respirations: Resp:  [16] 16   SPO2:     O2 Amount (l/min):     O2 Devices     Weight: Weight:  [93 kg (205 lb)] 93 kg (205 lb)     Physical Examination: General appearance - alert, well appearing, and in no distress  Chest - clear to auscultation, no wheezes, rales or rhonchi, symmetric air entry  Heart -  normal rate, regular rhythm, normal S1, S2, no murmurs, rubs, clicks or gallops  Abdomen - Soft, gravid, nontender  Extremities - no pedal edema noted      Presentation: VTX   Cervix: Exam by: Method: sterile exam per physician (MD Peter)   Dilation: Cervical Dilation (cm): 3-4   Effacement: Cervical Effacement: 70-80%   Station: Fetal Station: 2-->-1, 0   Unchanged over 2 exams    Fetal Heart Rate Assessment   Method: Fetal HR Assessment Method: external   Beats/min: Fetal HR (beats/min): 130   Baseline: Fetal HR Baseline: normal range   Variability: Fetal HR Variability: moderate (amplitude range 6 to 25 bpm)   Accels: Fetal HR Accelerations: greater than/equal to 15 bpm, lasting at least 15 seconds   Decels: Fetal HR Decelerations: absent   Tracing Category:       Uterine Assessment   Method: Method: external tocotransducer   Frequency (min):     Ctx Count in 10 min: Contractions in 10 Minutes: x1   Duration:     Intensity: Contraction Intensity: no contractions   Intensity by IUPC:     Resting Tone: Uterine Resting Tone: soft by palpation   Resting Tone by IUPC:     [place] Units:       Laboratory Results:  None    GBS collected.      Assessment/Plan:   IUP 38w0d with false labor    1. CE unchanged over 2 exams. Pt reported contractions resolved.   2. GBS collected prior to dc  3. FWB reassuring  4. Dc home with precautions. Has follow up appt on Tuesday      Cris Green MD  12/2/2022  20:09 EST

## 2022-12-03 NOTE — NURSING NOTE
D/c instructions reviewed with pt. Pt MAGDA and has no questions. Ambulated off unit in stable condition. Denies need for wheelchair ride. Has all belongings  
20-Dec-2020 08:39

## 2022-12-03 NOTE — H&P (VIEW-ONLY)
HEAVENLY Maxwell  Obstetric History and Physical TRIAGE    CC: CONTRACTIONS    SUBJECTIVE:     Patient is a 27 y.o. female  currently at 38w0d, who presents with complaints of contractions. She has had limited PNC with only 3 office visits to date. Her pregnancy is c/b history of PTD x2.  She reports irregular contractions beginning earlier this afternoon. + FM no VB or LOF.       Prenatal Information:  Prenatal Results     POC Urine Glucose/Protein     Test Value Reference Range Date Time    Urine Glucose        Urine Protein              Initial Prenatal Labs     Test Value Reference Range Date Time    Hemoglobin        Hematocrit        Platelets        Rubella IgG ^ Immune   18     Hepatitis B SAg        Hepatitis C Ab ^ nonreactive   18     RPR ^ Non-Reactive   18     ABO  B   18 1640    Rh  Positive   18 1640    Antibody Screen        HIV ^ Non-Reactive   18     Urine Culture        Gonorrhea ^ Negative   18     Chlamydia ^ Negative   18     TSH        HgB A1c               2nd and 3rd Trimester     Test Value Reference Range Date Time    Hemoglobin (repeated)        Hematocrit (repeated)        Platelets         GCT        Antibody Screen (repeated)        GTT Fasting        GTT 1 Hr        GTT 2 Hr        GTT 3 Hr        Group B Strep              Drug Screening     Test Value Reference Range Date Time    Amphetamine Screen        Barbiturate Screen        Benzodiazepine Screen        Methadone Screen        Phencyclidine Screen        Opiates Screen        THC Screen        Cocaine Screen        Propoxyphene Screen        Buprenorphine Screen        Methamphetamine Screen        Oxycodone Screen        Tricyclic Antidepressants Screen              Other (Risk screening)     Test Value Reference Range Date Time    Varicella IgG        Parvovirus IgG        CMV IgG        Cystic Fibrosis        Hemoglobin electrophoresis        NIPT        MSAFP-4        AFP  (for NTD only)              Legend    ^: Historical                      External Prenatal Results     Pregnancy Outside Results - Transcribed From Office Records - See Scanned Records For Details     Test Value Date Time    ABO  B  18 1640    Rh  Positive  18 1640    Antibody Screen  Negative  18 1640    Varicella IgG       Rubella ^ Immune  18     Hgb  10.2 g/dL 18 0710    Hct  32.6 % 18 0710    Glucose Fasting GTT       Glucose Tolerance Test 1 hour       Glucose Tolerance Test 3 hour       Gonorrhea (discrete) ^ Negative  18     Chlamydia (discrete) ^ Negative  18     RPR ^ Non-Reactive  18     VDRL       Syphilis Antibody       HBsAg       Herpes Simplex Virus PCR       Herpes Simplex VIrus Culture       HIV ^ Non-Reactive  18     Hep C RNA Quant PCR       Hep C Antibody ^ nonreactive  18     AFP       Group B Strep ^ Negative  18     GBS Susceptibility to Clindamycin       GBS Susceptibility to Erythromycin       Fetal Fibronectin       Genetic Testing, Maternal Blood             Drug Screening     Test Value Date Time    Urine Drug Screen       Amphetamine Screen       Barbiturate Screen       Benzodiazepine Screen       Methadone Screen       Phencyclidine Screen       Opiates Screen       THC Screen       Cocaine Screen       Propoxyphene Screen       Buprenorphine Screen       Methamphetamine Screen       Oxycodone Screen       Tricyclic Antidepressants Screen             Legend    ^: Historical                         OB History:                     OB History    Para Term  AB Living   8 4 1 2 3 4   SAB IAB Ectopic Molar Multiple Live Births   1 2 0 0 0 4      # Outcome Date GA Lbr Renaldo/2nd Weight Sex Delivery Anes PTL Lv   8 Current            7 IAB 2020           6  19 36w0d    Vag-Spont   DAYSI   5 Term 18 37w0d 06:17 / 00:08 2995 g (6 lb 9.6 oz) M Vag-Spont EPI N DAYSI      Name: BARBRA MCMILLAN       Apgar1: 8  Apgar5: 9   4 IAB 2018           3 SAB 17 20w0d          2 Para 16 37w0d  2778 g (6 lb 2 oz) M Vag-Spont EPI N DAYSI   1  12 34w0d  2920 g (6 lb 7 oz) M Vag-Spont EPI Y DAYSI      Allergies:                      No Known Allergies   Past Medical History: Past Medical History:   Diagnosis Date   • Chlamydia     previous preg   • Gonorrhea     previous preg   • Sickle cell trait (HCC)    • Trichomoniasis    • Urogenital trichomoniasis     previous preg      Past Surgical History Past Surgical History:   Procedure Laterality Date   • TONSILLECTOMY     • WISDOM TOOTH EXTRACTION        Family History: Family History   Problem Relation Age of Onset   • No Known Problems Father    • No Known Problems Mother    • No Known Problems Brother    • No Known Problems Sister    • No Known Problems Son    • No Known Problems Daughter    • No Known Problems Paternal Grandfather    • No Known Problems Paternal Grandmother    • No Known Problems Maternal Grandmother    • No Known Problems Maternal Grandfather       Social History:  reports that she quit smoking about 11 months ago. Her smoking use included cigarettes. She smoked an average of .5 packs per day. She has never used smokeless tobacco.   reports no history of alcohol use.   reports no history of drug use.    General ROS: Pertinent items are noted in HPI, all other systems reviewed and negative   Medications: docusate sodium, ferrous sulfate, and prenatal vitamin 27-0.8       Objective       Vital Signs Range for the last 24 hours  Temperature: Temp:  [98.1 °F (36.7 °C)] 98.1 °F (36.7 °C)   Temp Source: Temp src: Oral   BP:     Pulse:     Respirations: Resp:  [16] 16   SPO2:     O2 Amount (l/min):     O2 Devices     Weight: Weight:  [93 kg (205 lb)] 93 kg (205 lb)     Physical Examination: General appearance - alert, well appearing, and in no distress  Chest - clear to auscultation, no wheezes, rales or rhonchi, symmetric air entry  Heart -  normal rate, regular rhythm, normal S1, S2, no murmurs, rubs, clicks or gallops  Abdomen - Soft, gravid, nontender  Extremities - no pedal edema noted      Presentation: VTX   Cervix: Exam by: Method: sterile exam per physician (MD Peter)   Dilation: Cervical Dilation (cm): 3-4   Effacement: Cervical Effacement: 70-80%   Station: Fetal Station: 2-->-1, 0   Unchanged over 2 exams    Fetal Heart Rate Assessment   Method: Fetal HR Assessment Method: external   Beats/min: Fetal HR (beats/min): 130   Baseline: Fetal HR Baseline: normal range   Variability: Fetal HR Variability: moderate (amplitude range 6 to 25 bpm)   Accels: Fetal HR Accelerations: greater than/equal to 15 bpm, lasting at least 15 seconds   Decels: Fetal HR Decelerations: absent   Tracing Category:       Uterine Assessment   Method: Method: external tocotransducer   Frequency (min):     Ctx Count in 10 min: Contractions in 10 Minutes: x1   Duration:     Intensity: Contraction Intensity: no contractions   Intensity by IUPC:     Resting Tone: Uterine Resting Tone: soft by palpation   Resting Tone by IUPC:     [place] Units:       Laboratory Results:  None    GBS collected.      Assessment/Plan:   IUP 38w0d with false labor    1. CE unchanged over 2 exams. Pt reported contractions resolved.   2. GBS collected prior to dc  3. FWB reassuring  4. Dc home with precautions. Has follow up appt on Tuesday      Cris Green MD  12/2/2022  20:09 EST

## 2022-12-05 LAB — BACTERIA SPEC AEROBE CULT: NORMAL

## 2022-12-07 ENCOUNTER — ANESTHESIA EVENT (OUTPATIENT)
Dept: LABOR AND DELIVERY | Facility: HOSPITAL | Age: 27
End: 2022-12-07

## 2022-12-07 ENCOUNTER — ANESTHESIA (OUTPATIENT)
Dept: LABOR AND DELIVERY | Facility: HOSPITAL | Age: 27
End: 2022-12-07

## 2022-12-07 ENCOUNTER — HOSPITAL ENCOUNTER (INPATIENT)
Facility: HOSPITAL | Age: 27
LOS: 1 days | Discharge: HOME | End: 2022-12-08
Attending: OBSTETRICS & GYNECOLOGY | Admitting: OBSTETRICS & GYNECOLOGY

## 2022-12-07 PROBLEM — Z34.90 TERM PREGNANCY: Status: ACTIVE | Noted: 2022-12-07

## 2022-12-07 PROBLEM — Z34.90 PREGNANCY: Status: RESOLVED | Noted: 2022-11-09 | Resolved: 2022-12-07

## 2022-12-07 PROBLEM — Z34.90 TERM PREGNANCY: Status: RESOLVED | Noted: 2022-12-07 | Resolved: 2022-12-07

## 2022-12-07 LAB
ABO GROUP BLD: NORMAL
ALP SERPL-CCNC: 174 U/L (ref 39–117)
ALT SERPL W P-5'-P-CCNC: 11 U/L (ref 1–33)
AST SERPL-CCNC: 14 U/L (ref 1–32)
BILIRUB SERPL-MCNC: <0.2 MG/DL (ref 0–1.2)
BLD GP AB SCN SERPL QL: NEGATIVE
CREAT SERPL-MCNC: 0.65 MG/DL (ref 0.57–1)
DEPRECATED RDW RBC AUTO: 33 FL (ref 37–54)
ERYTHROCYTE [DISTWIDTH] IN BLOOD BY AUTOMATED COUNT: 13.9 % (ref 12.3–15.4)
HCT VFR BLD AUTO: 34.2 % (ref 34–46.6)
HGB BLD-MCNC: 10.9 G/DL (ref 12–15.9)
LDH SERPL-CCNC: 206 U/L (ref 135–214)
MCH RBC QN AUTO: 21.7 PG (ref 26.6–33)
MCHC RBC AUTO-ENTMCNC: 31.9 G/DL (ref 31.5–35.7)
MCV RBC AUTO: 68 FL (ref 79–97)
PLATELET # BLD AUTO: 174 10*3/MM3 (ref 140–450)
RBC # BLD AUTO: 5.03 10*6/MM3 (ref 3.77–5.28)
RH BLD: POSITIVE
T&S EXPIRATION DATE: NORMAL
URATE SERPL-MCNC: 4.5 MG/DL (ref 2.4–5.7)
WBC NRBC COR # BLD: 7.95 10*3/MM3 (ref 3.4–10.8)

## 2022-12-07 PROCEDURE — 85027 COMPLETE CBC AUTOMATED: CPT | Performed by: OBSTETRICS & GYNECOLOGY

## 2022-12-07 PROCEDURE — 86850 RBC ANTIBODY SCREEN: CPT | Performed by: OBSTETRICS & GYNECOLOGY

## 2022-12-07 PROCEDURE — 86900 BLOOD TYPING SEROLOGIC ABO: CPT | Performed by: OBSTETRICS & GYNECOLOGY

## 2022-12-07 PROCEDURE — 84075 ASSAY ALKALINE PHOSPHATASE: CPT | Performed by: OBSTETRICS & GYNECOLOGY

## 2022-12-07 PROCEDURE — 82565 ASSAY OF CREATININE: CPT | Performed by: OBSTETRICS & GYNECOLOGY

## 2022-12-07 PROCEDURE — 86901 BLOOD TYPING SEROLOGIC RH(D): CPT | Performed by: OBSTETRICS & GYNECOLOGY

## 2022-12-07 PROCEDURE — 83615 LACTATE (LD) (LDH) ENZYME: CPT | Performed by: OBSTETRICS & GYNECOLOGY

## 2022-12-07 PROCEDURE — 25010000002 OXYTOCIN PER 10 UNITS

## 2022-12-07 PROCEDURE — 84550 ASSAY OF BLOOD/URIC ACID: CPT | Performed by: OBSTETRICS & GYNECOLOGY

## 2022-12-07 PROCEDURE — 25010000002 ROPIVACAINE PER 1 MG: Performed by: ANESTHESIOLOGY

## 2022-12-07 PROCEDURE — 84450 TRANSFERASE (AST) (SGOT): CPT | Performed by: OBSTETRICS & GYNECOLOGY

## 2022-12-07 PROCEDURE — C1755 CATHETER, INTRASPINAL: HCPCS | Performed by: ANESTHESIOLOGY

## 2022-12-07 PROCEDURE — 84460 ALANINE AMINO (ALT) (SGPT): CPT | Performed by: OBSTETRICS & GYNECOLOGY

## 2022-12-07 PROCEDURE — 82247 BILIRUBIN TOTAL: CPT | Performed by: OBSTETRICS & GYNECOLOGY

## 2022-12-07 PROCEDURE — 25010000002 FENTANYL CITRATE (PF) 50 MCG/ML SOLUTION: Performed by: ANESTHESIOLOGY

## 2022-12-07 RX ORDER — ROPIVACAINE HYDROCHLORIDE 2 MG/ML
15 INJECTION, SOLUTION EPIDURAL; INFILTRATION; PERINEURAL CONTINUOUS
Status: DISCONTINUED | OUTPATIENT
Start: 2022-12-07 | End: 2022-12-07

## 2022-12-07 RX ORDER — ACETAMINOPHEN 325 MG/1
650 TABLET ORAL EVERY 4 HOURS PRN
Status: CANCELLED | OUTPATIENT
Start: 2022-12-07

## 2022-12-07 RX ORDER — SODIUM CHLORIDE, SODIUM LACTATE, POTASSIUM CHLORIDE, CALCIUM CHLORIDE 600; 310; 30; 20 MG/100ML; MG/100ML; MG/100ML; MG/100ML
125 INJECTION, SOLUTION INTRAVENOUS CONTINUOUS
Status: DISCONTINUED | OUTPATIENT
Start: 2022-12-07 | End: 2022-12-07

## 2022-12-07 RX ORDER — BUPIVACAINE HYDROCHLORIDE 2.5 MG/ML
INJECTION, SOLUTION EPIDURAL; INFILTRATION; INTRACAUDAL AS NEEDED
Status: DISCONTINUED | OUTPATIENT
Start: 2022-12-07 | End: 2022-12-07 | Stop reason: SURG

## 2022-12-07 RX ORDER — PROMETHAZINE HYDROCHLORIDE 12.5 MG/1
12.5 SUPPOSITORY RECTAL EVERY 6 HOURS PRN
Status: CANCELLED | OUTPATIENT
Start: 2022-12-07

## 2022-12-07 RX ORDER — LIDOCAINE HYDROCHLORIDE AND EPINEPHRINE 20; 5 MG/ML; UG/ML
INJECTION, SOLUTION EPIDURAL; INFILTRATION; INTRACAUDAL; PERINEURAL AS NEEDED
Status: DISCONTINUED | OUTPATIENT
Start: 2022-12-07 | End: 2022-12-07 | Stop reason: SURG

## 2022-12-07 RX ORDER — EPHEDRINE SULFATE 5 MG/ML
10 INJECTION INTRAVENOUS
Status: DISCONTINUED | OUTPATIENT
Start: 2022-12-07 | End: 2022-12-07 | Stop reason: HOSPADM

## 2022-12-07 RX ORDER — OXYTOCIN/0.9 % SODIUM CHLORIDE 30/500 ML
PLASTIC BAG, INJECTION (ML) INTRAVENOUS
Status: COMPLETED
Start: 2022-12-07 | End: 2022-12-07

## 2022-12-07 RX ORDER — PRENATAL VIT/IRON FUM/FOLIC AC 27MG-0.8MG
1 TABLET ORAL DAILY
Status: DISCONTINUED | OUTPATIENT
Start: 2022-12-07 | End: 2022-12-08 | Stop reason: HOSPADM

## 2022-12-07 RX ORDER — IBUPROFEN 600 MG/1
600 TABLET ORAL EVERY 6 HOURS PRN
Status: DISCONTINUED | OUTPATIENT
Start: 2022-12-07 | End: 2022-12-07 | Stop reason: HOSPADM

## 2022-12-07 RX ORDER — IBUPROFEN 600 MG/1
600 TABLET ORAL EVERY 6 HOURS PRN
Status: DISCONTINUED | OUTPATIENT
Start: 2022-12-07 | End: 2022-12-08 | Stop reason: HOSPADM

## 2022-12-07 RX ORDER — MORPHINE SULFATE 2 MG/ML
2 INJECTION, SOLUTION INTRAMUSCULAR; INTRAVENOUS
Status: CANCELLED | OUTPATIENT
Start: 2022-12-07 | End: 2022-12-17

## 2022-12-07 RX ORDER — ONDANSETRON 4 MG/1
4 TABLET, FILM COATED ORAL EVERY 6 HOURS PRN
Status: DISCONTINUED | OUTPATIENT
Start: 2022-12-07 | End: 2022-12-07 | Stop reason: HOSPADM

## 2022-12-07 RX ORDER — LIDOCAINE HYDROCHLORIDE AND EPINEPHRINE 15; 5 MG/ML; UG/ML
INJECTION, SOLUTION EPIDURAL AS NEEDED
Status: DISCONTINUED | OUTPATIENT
Start: 2022-12-07 | End: 2022-12-07 | Stop reason: SURG

## 2022-12-07 RX ORDER — FENTANYL CITRATE 50 UG/ML
INJECTION, SOLUTION INTRAMUSCULAR; INTRAVENOUS AS NEEDED
Status: DISCONTINUED | OUTPATIENT
Start: 2022-12-07 | End: 2022-12-07 | Stop reason: SURG

## 2022-12-07 RX ORDER — LIDOCAINE HYDROCHLORIDE 10 MG/ML
5 INJECTION, SOLUTION EPIDURAL; INFILTRATION; INTRACAUDAL; PERINEURAL AS NEEDED
Status: DISCONTINUED | OUTPATIENT
Start: 2022-12-07 | End: 2022-12-07 | Stop reason: HOSPADM

## 2022-12-07 RX ORDER — ONDANSETRON 2 MG/ML
4 INJECTION INTRAMUSCULAR; INTRAVENOUS EVERY 6 HOURS PRN
Status: DISCONTINUED | OUTPATIENT
Start: 2022-12-07 | End: 2022-12-07 | Stop reason: HOSPADM

## 2022-12-07 RX ORDER — FERROUS SULFATE 325(65) MG
325 TABLET ORAL 2 TIMES DAILY WITH MEALS
Status: DISCONTINUED | OUTPATIENT
Start: 2022-12-07 | End: 2022-12-08 | Stop reason: HOSPADM

## 2022-12-07 RX ORDER — TRISODIUM CITRATE DIHYDRATE AND CITRIC ACID MONOHYDRATE 500; 334 MG/5ML; MG/5ML
30 SOLUTION ORAL ONCE
Status: DISCONTINUED | OUTPATIENT
Start: 2022-12-07 | End: 2022-12-07 | Stop reason: HOSPADM

## 2022-12-07 RX ORDER — SODIUM CHLORIDE 0.9 % (FLUSH) 0.9 %
10 SYRINGE (ML) INJECTION EVERY 12 HOURS SCHEDULED
Status: DISCONTINUED | OUTPATIENT
Start: 2022-12-07 | End: 2022-12-07 | Stop reason: HOSPADM

## 2022-12-07 RX ORDER — SODIUM CHLORIDE 0.9 % (FLUSH) 0.9 %
10 SYRINGE (ML) INJECTION AS NEEDED
Status: DISCONTINUED | OUTPATIENT
Start: 2022-12-07 | End: 2022-12-07 | Stop reason: HOSPADM

## 2022-12-07 RX ORDER — FAMOTIDINE 10 MG/ML
20 INJECTION, SOLUTION INTRAVENOUS ONCE AS NEEDED
Status: DISCONTINUED | OUTPATIENT
Start: 2022-12-07 | End: 2022-12-07 | Stop reason: HOSPADM

## 2022-12-07 RX ORDER — ONDANSETRON 2 MG/ML
4 INJECTION INTRAMUSCULAR; INTRAVENOUS ONCE AS NEEDED
Status: DISCONTINUED | OUTPATIENT
Start: 2022-12-07 | End: 2022-12-07 | Stop reason: HOSPADM

## 2022-12-07 RX ORDER — PROMETHAZINE HYDROCHLORIDE 12.5 MG/1
12.5 TABLET ORAL EVERY 6 HOURS PRN
Status: DISCONTINUED | OUTPATIENT
Start: 2022-12-07 | End: 2022-12-07 | Stop reason: HOSPADM

## 2022-12-07 RX ORDER — EPHEDRINE SULFATE 5 MG/ML
INJECTION INTRAVENOUS
Status: DISCONTINUED
Start: 2022-12-07 | End: 2022-12-08 | Stop reason: HOSPADM

## 2022-12-07 RX ORDER — PROMETHAZINE HYDROCHLORIDE 12.5 MG/1
12.5 TABLET ORAL EVERY 6 HOURS PRN
Status: CANCELLED | OUTPATIENT
Start: 2022-12-07

## 2022-12-07 RX ORDER — DOCUSATE SODIUM 100 MG/1
100 CAPSULE, LIQUID FILLED ORAL 2 TIMES DAILY
Status: DISCONTINUED | OUTPATIENT
Start: 2022-12-07 | End: 2022-12-08 | Stop reason: HOSPADM

## 2022-12-07 RX ORDER — DIPHENHYDRAMINE HYDROCHLORIDE 50 MG/ML
12.5 INJECTION INTRAMUSCULAR; INTRAVENOUS EVERY 8 HOURS PRN
Status: DISCONTINUED | OUTPATIENT
Start: 2022-12-07 | End: 2022-12-07 | Stop reason: HOSPADM

## 2022-12-07 RX ORDER — ACETAMINOPHEN 325 MG/1
650 TABLET ORAL EVERY 4 HOURS PRN
Status: DISCONTINUED | OUTPATIENT
Start: 2022-12-07 | End: 2022-12-07 | Stop reason: HOSPADM

## 2022-12-07 RX ORDER — MAGNESIUM CARB/ALUMINUM HYDROX 105-160MG
30 TABLET,CHEWABLE ORAL ONCE
Status: COMPLETED | OUTPATIENT
Start: 2022-12-07 | End: 2022-12-07

## 2022-12-07 RX ADMIN — LIDOCAINE HYDROCHLORIDE AND EPINEPHRINE 3 ML: 15; 5 INJECTION, SOLUTION EPIDURAL at 04:02

## 2022-12-07 RX ADMIN — DOCUSATE SODIUM 100 MG: 100 CAPSULE, LIQUID FILLED ORAL at 20:17

## 2022-12-07 RX ADMIN — BUPIVACAINE HYDROCHLORIDE 12 ML: 2.5 INJECTION, SOLUTION EPIDURAL; INFILTRATION; INTRACAUDAL; PERINEURAL at 04:05

## 2022-12-07 RX ADMIN — ROPIVACAINE HYDROCHLORIDE 17 ML/HR: 2 INJECTION, SOLUTION EPIDURAL; INFILTRATION at 04:11

## 2022-12-07 RX ADMIN — SODIUM CHLORIDE, POTASSIUM CHLORIDE, SODIUM LACTATE AND CALCIUM CHLORIDE 1000 ML: 600; 310; 30; 20 INJECTION, SOLUTION INTRAVENOUS at 03:44

## 2022-12-07 RX ADMIN — LIDOCAINE HYDROCHLORIDE,EPINEPHRINE BITARTRATE 5 ML: 20; .005 INJECTION, SOLUTION EPIDURAL; INFILTRATION; INTRACAUDAL; PERINEURAL at 04:09

## 2022-12-07 RX ADMIN — IBUPROFEN 600 MG: 600 TABLET ORAL at 20:17

## 2022-12-07 RX ADMIN — WITCH HAZEL 1 PAD: 500 SOLUTION RECTAL; TOPICAL at 20:17

## 2022-12-07 RX ADMIN — FENTANYL CITRATE 100 MCG: 50 INJECTION, SOLUTION INTRAMUSCULAR; INTRAVENOUS at 04:04

## 2022-12-07 RX ADMIN — SODIUM CHLORIDE, POTASSIUM CHLORIDE, SODIUM LACTATE AND CALCIUM CHLORIDE 125 ML/HR: 600; 310; 30; 20 INJECTION, SOLUTION INTRAVENOUS at 01:34

## 2022-12-07 RX ADMIN — Medication: at 20:17

## 2022-12-07 RX ADMIN — MINERAL OIL 30 ML: 1000 SOLUTION ORAL at 05:00

## 2022-12-07 RX ADMIN — LIDOCAINE HYDROCHLORIDE AND EPINEPHRINE 2 ML: 15; 5 INJECTION, SOLUTION EPIDURAL at 04:03

## 2022-12-07 RX ADMIN — OXYTOCIN 30 UNITS: 10 INJECTION, SOLUTION INTRAMUSCULAR; INTRAVENOUS at 05:17

## 2022-12-07 RX ADMIN — IBUPROFEN 600 MG: 600 TABLET ORAL at 07:10

## 2022-12-07 RX ADMIN — FERROUS SULFATE TAB 325 MG (65 MG ELEMENTAL FE) 325 MG: 325 (65 FE) TAB at 20:17

## 2022-12-07 RX ADMIN — SODIUM CHLORIDE, POTASSIUM CHLORIDE, SODIUM LACTATE AND CALCIUM CHLORIDE 125 ML/HR: 600; 310; 30; 20 INJECTION, SOLUTION INTRAVENOUS at 00:40

## 2022-12-07 NOTE — ANESTHESIA POSTPROCEDURE EVALUATION
Patient: Dinorah Duron    Procedure Summary     Date: 12/07/22 Room / Location:     Anesthesia Start: 0354 Anesthesia Stop: 0517    Procedure: LABOR ANALGESIA Diagnosis:     Scheduled Providers:  Provider: Beto Cao DO    Anesthesia Type: epidural ASA Status: 2          Anesthesia Type: epidural    Vitals  Vitals Value Taken Time   /67 12/07/22 0900   Temp 98.2 °F (36.8 °C) 12/07/22 0900   Pulse 72 12/07/22 0900   Resp 16 12/07/22 0900   SpO2             Post Anesthesia Care and Evaluation    Patient location during evaluation: bedside  Patient participation: complete - patient participated  Level of consciousness: awake and alert  Pain management: adequate    Airway patency: patent  Anesthetic complications: No anesthetic complications    Cardiovascular status: acceptable  Respiratory status: acceptable  Hydration status: acceptable  Post Neuraxial Block status: Motor and sensory function returned to baseline and No signs or symptoms of PDPH

## 2022-12-07 NOTE — INTERVAL H&P NOTE
H&P updated. The patient was 38 weeks 5 days. She was  examined and the following changes are noted:  She presented in active labor and rapidly progressed to complete dilation. She had epidural placed with good block.  She complaints of hands feeling numb. She was noted to have slurred speech in office yesterday and again this morning. Denies drug use and refused UDS

## 2022-12-07 NOTE — ANESTHESIA PROCEDURE NOTES
Labor Epidural      Patient reassessed immediately prior to procedure    Patient location during procedure: OB  Performed By  Anesthesiologist: Beto Cao DO  Preanesthetic Checklist  Completed: patient identified, IV checked, site marked, risks and benefits discussed, surgical consent, monitors and equipment checked, pre-op evaluation and timeout performed  Additional Notes  Dural puncture performed with a 5 inch 25 g Cherri needle, clear CSF.  Prep:  Pt Position:sitting  Sterile Tech:gloves, mask, sterile barrier and cap  Prep:chlorhexidine gluconate and isopropyl alcohol  Monitoring:blood pressure monitoring and continuous pulse oximetry  Epidural Block Procedure:  Approach:midline  Guidance:landmark technique and palpation technique  Location:L3-L4  Needle Type:Tuohy  Needle Gauge:17 G  Loss of Resistance Medium: air  Loss of Resistance: 5cm  Cath Depth at skin:10 cm  Paresthesia: none  Aspiration:negative  Test Dose:negative  Number of Attempts: 1  Post Assessment:  Dressing:secured with tape and occlusive dressing applied (Tegaderm Placed)  Pt Tolerance:patient tolerated the procedure well with no apparent complications  Complications:no

## 2022-12-07 NOTE — PLAN OF CARE
Goal Outcome Evaluation:         Patient here for SROM which she reports happened at approximately 2350.  Copious amount of green tinged fluid present.  SVE done due to obvious gross rupture of membranes.  Patient denies any decreased fetal movement, bleeding, problems or concerns with pregnancy as well as any other problems or concerns.

## 2022-12-07 NOTE — L&D DELIVERY NOTE
[facility]   Vaginal Delivery Note    Patient Name: Dinorah Duron  : 1995  MRN: [id number]    Date of Delivery: 2022     Diagnosis     Pre & Post-Delivery:  Intrauterine pregnancy at 38w5d  Labor status: Spontaneous Onset of Labor     Spontaneous vaginal delivery             Problem List    Transfer to Postpartum     Review the Delivery Report for details.     Delivery     Delivery: Vaginal, Spontaneous     YOB: 2021    Time of Birth:  Gestational Age 5:12 AM   38w5d     Anesthesia: Epidural     Delivering clinician: Michelle Lehman CNCHANTEL   Forceps?   No   Vacuum? No    Shoulder dystocia present: No        Delivery narrative:  Dinorah pushed well to a spontaneous vaginal delivery of an viable female infant in CORIE position over intact perineum, with labor epidural. Amniotic fluid appears clear. Mouth and nose bulb suctioned after delivery of head. Shoulders and body delivered easily atraumatically. Infant placed on mothers abdomen and dried.  Cord was allowed to cease pulsation, double clamped, cut by FOB. Cord blood and cord segment obtained.  Spontaneous delivery of intact placenta with 3 vessel cord. Fundus firm, lochia light rubra.  Perineal and vaginal inspection revealed no laceration.  Mother and daughter stable in the immediate PP period.       Infant     Findings: female  infant     Infant observations: Weight: 3665 g (8 lb 1.3 oz)   Length: 19.75  in  Observations/Comments:        Apgars: 7  @ 1 minute /    9  @ 5 minutes   Infant Name: A'Noria     Placenta & Cord         Placenta delivered  Spontaneous  at   2022  5:17 AM     Cord: 3 vessels  present.   Nuchal Cord?  no   Cord blood obtained: Yes    Cord gases obtained:  No      Repair      Episiotomy: None     No    Lacerations: No   Estimated Blood Loss: Est. Blood Loss (mL): 100 mL (Filed from Delivery Summary) (22 0512)       Complications     none    Disposition     Mother to Mother  Baby/Postpartum  in stable condition currently.  Baby to remains with mom  in stable condition currently.    Michelle Lehman CNM  12/07/22  05:39 EST

## 2022-12-07 NOTE — PLAN OF CARE
Goal Outcome Evaluation:  Plan of Care Reviewed With: patient        Progress: no change  Outcome Evaluation: u/u small lochia

## 2022-12-07 NOTE — CASE MANAGEMENT/SOCIAL WORK
Continued Stay Note  [facility]     Patient Name: Dinorah Duron  MRN: [id number]  Today's Date: 12/7/2022    Admit Date: 12/7/2022    Plan: SW   Discharge Plan     Row Name 12/07/22 1437       Plan    Plan SW    Plan Comments MSW received consult due to late PNC and denied UDS. MSW met with Pt at bedside. MSW inquired about late PNC; Pt reported she went to [place] and had to miss an apt. and missed the last two. Pt reported she wanted to deny UDS.  Pt reports having car seat and other needs for baby. Pt denied any needs or concerns. MSW available.    Final Discharge Disposition Code 30 - still a patient               Discharge Codes    No documentation.                     GAIL Townsend

## 2022-12-08 VITALS
TEMPERATURE: 98 F | BODY MASS INDEX: 31.86 KG/M2 | RESPIRATION RATE: 16 BRPM | DIASTOLIC BLOOD PRESSURE: 85 MMHG | WEIGHT: 203 LBS | HEIGHT: 67 IN | HEART RATE: 59 BPM | SYSTOLIC BLOOD PRESSURE: 121 MMHG

## 2022-12-08 LAB
BASOPHILS # BLD AUTO: 0.03 10*3/MM3 (ref 0–0.2)
BASOPHILS NFR BLD AUTO: 0.4 % (ref 0–1.5)
DEPRECATED RDW RBC AUTO: 33.2 FL (ref 37–54)
EOSINOPHIL # BLD AUTO: 0.22 10*3/MM3 (ref 0–0.4)
EOSINOPHIL NFR BLD AUTO: 3 % (ref 0.3–6.2)
ERYTHROCYTE [DISTWIDTH] IN BLOOD BY AUTOMATED COUNT: 14 % (ref 12.3–15.4)
HCT VFR BLD AUTO: 33 % (ref 34–46.6)
HGB BLD-MCNC: 10.5 G/DL (ref 12–15.9)
HYPOCHROMIA BLD QL: NORMAL
IMM GRANULOCYTES # BLD AUTO: 0.03 10*3/MM3 (ref 0–0.05)
IMM GRANULOCYTES NFR BLD AUTO: 0.4 % (ref 0–0.5)
LYMPHOCYTES # BLD AUTO: 1.76 10*3/MM3 (ref 0.7–3.1)
LYMPHOCYTES NFR BLD AUTO: 23.7 % (ref 19.6–45.3)
MCH RBC QN AUTO: 21.8 PG (ref 26.6–33)
MCHC RBC AUTO-ENTMCNC: 31.8 G/DL (ref 31.5–35.7)
MCV RBC AUTO: 68.6 FL (ref 79–97)
MICROCYTES BLD QL: NORMAL
MONOCYTES # BLD AUTO: 0.76 10*3/MM3 (ref 0.1–0.9)
MONOCYTES NFR BLD AUTO: 10.2 % (ref 5–12)
NEUTROPHILS NFR BLD AUTO: 4.62 10*3/MM3 (ref 1.7–7)
NEUTROPHILS NFR BLD AUTO: 62.3 % (ref 42.7–76)
NRBC BLD AUTO-RTO: 0 /100 WBC (ref 0–0.2)
PLAT MORPH BLD: NORMAL
PLATELET # BLD AUTO: 161 10*3/MM3 (ref 140–450)
RBC # BLD AUTO: 4.81 10*6/MM3 (ref 3.77–5.28)
WBC MORPH BLD: NORMAL
WBC NRBC COR # BLD: 7.42 10*3/MM3 (ref 3.4–10.8)

## 2022-12-08 PROCEDURE — 85007 BL SMEAR W/DIFF WBC COUNT: CPT | Performed by: ADVANCED PRACTICE MIDWIFE

## 2022-12-08 PROCEDURE — 85025 COMPLETE CBC W/AUTO DIFF WBC: CPT | Performed by: ADVANCED PRACTICE MIDWIFE

## 2022-12-08 RX ORDER — IBUPROFEN 600 MG/1
600 TABLET ORAL EVERY 6 HOURS PRN
Qty: 60 TABLET | Refills: 1 | Status: SHIPPED | OUTPATIENT
Start: 2022-12-08

## 2022-12-08 RX ORDER — PSEUDOEPHEDRINE HCL 30 MG
100 TABLET ORAL 2 TIMES DAILY PRN
Qty: 60 EACH | Refills: 1 | Status: SHIPPED | OUTPATIENT
Start: 2022-12-08

## 2022-12-08 RX ADMIN — DOCUSATE SODIUM 100 MG: 100 CAPSULE, LIQUID FILLED ORAL at 08:24

## 2022-12-08 RX ADMIN — IBUPROFEN 600 MG: 600 TABLET ORAL at 08:24

## 2022-12-08 RX ADMIN — PRENATAL VITAMINS-IRON FUMARATE 27 MG IRON-FOLIC ACID 0.8 MG TABLET 1 TABLET: at 08:24

## 2022-12-08 RX ADMIN — FERROUS SULFATE TAB 325 MG (65 MG ELEMENTAL FE) 325 MG: 325 (65 FE) TAB at 08:24

## 2022-12-08 NOTE — PROGRESS NOTES
12/8/2022  PPD #1    Subjective   DaJure feels well.  Patient describes her lochia as less than menses.  Pain is well controlled  She desires discharge today.      Objective   Temp: Temp:  [97.5 °F (36.4 °C)-98.2 °F (36.8 °C)] 97.7 °F (36.5 °C) Temp src: Oral   BP: BP: (115-138)/(67-79) 130/67        Pulse: Heart Rate:  [59-74] 64  RR: Resp:  [16-18] 18    General:  No acute distress   Abdomen: Fundus firm and beneath umbilicus   Pelvis: deferred     Lab Results   Component Value Date    WBC 7.95 12/07/2022    HGB 10.9 (L) 12/07/2022    HCT 34.2 12/07/2022    MCV 68.0 (L) 12/07/2022     12/07/2022    AST 14 12/07/2022    URICACID 4.5 12/07/2022       Assessment  1. PPD# 1 after vaginal delivery    Plan  1. Discharge to home  2. Follow up with LW  in 6 weeks  3. Prescriptions for Motrin and Colace prescribed and advised on weaning.  4. Advised no tampons, intercourse, or tub baths for 6 weeks.       This note has been electronically signed.    Anam Marroquin CNM  08:23 EST  December 8, 2022

## 2022-12-08 NOTE — DISCHARGE SUMMARY
[facility]  Vaginal delivery discharge summary      Patient: Dinorah Duron      MR#:[id number]  Admission  Diagnosis:   Patient Active Problem List   Diagnosis   • Spontaneous vaginal delivery     Discharge Diagnosis: Spontaneous vaginal delivery.      Date of Admission: 12/7/2022  Date of Discharge:  12/8/2022    Procedures:  Vaginal, Spontaneous     12/7/2022    5:12 AM      Service:  Obstetrics    Hospital Course:  Patient underwent vaginal delivery and remained in the hospital for 1 days.  During that time she remained afebrile and hemodynamically stable.  On the day of discharge, she was eating, ambulating and voiding without difficulty.      Labs:    Lab Results   Component Value Date    WBC 7.95 12/07/2022    HGB 10.9 (L) 12/07/2022    HCT 34.2 12/07/2022    MCV 68.0 (L) 12/07/2022     12/07/2022    URICACID 4.5 12/07/2022    AST 14 12/07/2022    ALT 11 12/07/2022     12/07/2022     Results from last 7 days   Lab Units 12/07/22  0101   ABO TYPING  B   RH TYPING  Positive   ANTIBODY SCREEN  Negative       Discharge Medications     Discharge Medications      New Medications      Instructions Start Date   ibuprofen 600 MG tablet  Commonly known as: ADVIL,MOTRIN   600 mg, Oral, Every 6 Hours PRN         Changes to Medications      Instructions Start Date   docusate sodium 100 MG capsule  What changed:   · when to take this  · reasons to take this   100 mg, Oral, 2 Times Daily PRN         Continue These Medications      Instructions Start Date   prenatal vitamin 27-0.8 27-0.8 MG tablet tablet   1 tablet, Oral, Daily         Stop These Medications    ferrous sulfate 325 (65 FE) MG tablet            Discharge Disposition:  To Home    Discharge Condition:  Stable    Discharge Diet: Regular    Activity at Discharge: Pelvic rest    Follow-up Appointments  Follow up with [facility] in 6 weeks.     Anam Marroquin CNM  12/08/22  08:22 EST

## 2022-12-09 NOTE — PAYOR COMM NOTE
"Michelle Mcmillan (27 y.o. Female) NOTIFICATION OF DISCHARGE  [id number]    Date of Birth   1995    Social Security Number       Address   [address] [place] [address]    Home Phone   [phone]    MRN   [id number]       Restorationist   Non-Religion    Marital Status   Single                            Admission Date   12/7/22    Admission Type   Elective    Admitting Provider   Cris Green MD    Attending Provider       Department, Room/Bed   [facility] MOTHER BAB[facility] 4A, N410/1       Discharge Date   12/8/2022    Discharge Disposition   Home or Self Care    Discharge Destination                               Attending Provider: (none)   Allergies: No Known Allergies    Isolation: None   Infection: None   Code Status: Prior    Ht: 170.2 cm (67\")   Wt: 92.1 kg (203 lb)    Admission Cmt: None   Principal Problem: Term pregnancy [Z34.90]                 Active Insurance as of 12/7/2022     Primary Coverage     Payor Plan Insurance Group Employer/Plan Group    [place] [id number]     Payor Plan Address Payor Plan Phone Number Payor Plan Fax Number Effective Dates    [employer] [address] [address] [phone]  1/1/2021 - None Entered    [place] [address]       Subscriber Name Subscriber Birth Date Member ID       MICHELLE MCMILLAN 1995 [id number]                 Emergency Contacts      (Rel.) Home Phone Work Phone Mobile Phone    Dee Segal (Mother) [phone] -- --            Insurance Information                [place] Phone: [phone]    Subscriber: Michelle Mcmillan Paula Marinelliell Subscriber#: [id number]    Group#: [id number] Precert#: --             Discharge Summary      Anam Marroquin CNM at 12/08/22 0821     Attestation signed by Lakisha Hunter MD at 12/08/22 4793    I have reviewed this documentation and agree.                  [facility]  Vaginal delivery discharge " summary      Patient: Dinorah Duron      MR#:[id number]  Admission  Diagnosis:   Patient Active Problem List   Diagnosis   • Spontaneous vaginal delivery     Discharge Diagnosis: Spontaneous vaginal delivery.      Date of Admission: 12/7/2022  Date of Discharge:  12/8/2022    Procedures:  Vaginal, Spontaneous     12/7/2022    5:12 AM      Service:  Obstetrics    Hospital Course:  Patient underwent vaginal delivery and remained in the hospital for 1 days.  During that time she remained afebrile and hemodynamically stable.  On the day of discharge, she was eating, ambulating and voiding without difficulty.      Labs:    Lab Results   Component Value Date    WBC 7.95 12/07/2022    HGB 10.9 (L) 12/07/2022    HCT 34.2 12/07/2022    MCV 68.0 (L) 12/07/2022     12/07/2022    URICACID 4.5 12/07/2022    AST 14 12/07/2022    ALT 11 12/07/2022     12/07/2022     Results from last 7 days   Lab Units 12/07/22  0101   ABO TYPING  B   RH TYPING  Positive   ANTIBODY SCREEN  Negative       Discharge Medications     Discharge Medications      New Medications      Instructions Start Date   ibuprofen 600 MG tablet  Commonly known as: ADVIL,MOTRIN   600 mg, Oral, Every 6 Hours PRN         Changes to Medications      Instructions Start Date   docusate sodium 100 MG capsule  What changed:   · when to take this  · reasons to take this   100 mg, Oral, 2 Times Daily PRN         Continue These Medications      Instructions Start Date   prenatal vitamin 27-0.8 27-0.8 MG tablet tablet   1 tablet, Oral, Daily         Stop These Medications    ferrous sulfate 325 (65 FE) MG tablet            Discharge Disposition:  To Home    Discharge Condition:  Stable    Discharge Diet: Regular    Activity at Discharge: Pelvic rest    Follow-up Appointments  Follow up with [facility] in 6 weeks.     Anam Marroquin CNM  12/08/22  08:22 EST    Electronically signed by Lakisha Hunter MD at 12/08/22 [id number]

## 2023-05-02 ENCOUNTER — TRANSCRIBE ORDERS (OUTPATIENT)
Dept: LAB | Facility: HOSPITAL | Age: 28
End: 2023-05-02
Payer: MEDICAID

## 2023-05-02 DIAGNOSIS — Z11.3 SCREENING EXAMINATION FOR VENEREAL DISEASE: Primary | ICD-10-CM

## 2023-05-15 ENCOUNTER — LAB REQUISITION (OUTPATIENT)
Dept: LAB | Facility: HOSPITAL | Age: 28
End: 2023-05-15
Payer: MEDICAID

## 2023-05-15 DIAGNOSIS — N75.0 CYST OF BARTHOLIN'S GLAND: ICD-10-CM

## 2023-05-15 LAB — HCG INTACT+B SERPL-ACNC: NORMAL MIU/ML

## 2023-05-15 PROCEDURE — 84702 CHORIONIC GONADOTROPIN TEST: CPT | Performed by: OBSTETRICS & GYNECOLOGY
